# Patient Record
Sex: FEMALE | Race: WHITE | HISPANIC OR LATINO | URBAN - METROPOLITAN AREA
[De-identification: names, ages, dates, MRNs, and addresses within clinical notes are randomized per-mention and may not be internally consistent; named-entity substitution may affect disease eponyms.]

---

## 2017-04-17 ENCOUNTER — INPATIENT (INPATIENT)
Facility: HOSPITAL | Age: 32
LOS: 2 days | Discharge: HOME | End: 2017-04-20
Attending: OBSTETRICS & GYNECOLOGY | Admitting: OBSTETRICS & GYNECOLOGY

## 2017-04-17 VITALS — WEIGHT: 165.35 LBS | HEIGHT: 66 IN

## 2017-04-17 LAB
BASOPHILS # BLD AUTO: 0 K/UL — SIGNIFICANT CHANGE UP (ref 0–0.2)
BASOPHILS NFR BLD AUTO: 0.3 % — SIGNIFICANT CHANGE UP (ref 0–2)
BLD GP AB SCN SERPL QL: SIGNIFICANT CHANGE UP
EOSINOPHIL # BLD AUTO: 0 K/UL — SIGNIFICANT CHANGE UP (ref 0–0.5)
EOSINOPHIL NFR BLD AUTO: 0.2 % — SIGNIFICANT CHANGE UP (ref 0–6)
HCT VFR BLD CALC: 39 % — SIGNIFICANT CHANGE UP (ref 34.5–45)
HGB BLD-MCNC: 12.8 G/DL — SIGNIFICANT CHANGE UP (ref 11.5–15.5)
LYMPHOCYTES # BLD AUTO: 1.7 K/UL — SIGNIFICANT CHANGE UP (ref 1–3.3)
LYMPHOCYTES # BLD AUTO: 12.7 % — LOW (ref 13–44)
MCHC RBC-ENTMCNC: 28.7 PG — SIGNIFICANT CHANGE UP (ref 27–34)
MCHC RBC-ENTMCNC: 32.9 GM/DL — SIGNIFICANT CHANGE UP (ref 32–36)
MCV RBC AUTO: 87.3 FL — SIGNIFICANT CHANGE UP (ref 80–100)
MONOCYTES # BLD AUTO: 0.6 K/UL — SIGNIFICANT CHANGE UP (ref 0–0.9)
MONOCYTES NFR BLD AUTO: 4.5 % — SIGNIFICANT CHANGE UP (ref 2–14)
NEUTROPHILS # BLD AUTO: 11 K/UL — HIGH (ref 1.8–7.4)
NEUTROPHILS NFR BLD AUTO: 82.3 % — HIGH (ref 43–77)
PLATELET # BLD AUTO: 171 K/UL — SIGNIFICANT CHANGE UP (ref 150–400)
RBC # BLD: 4.47 M/UL — SIGNIFICANT CHANGE UP (ref 3.8–5.2)
RBC # FLD: 12 % — SIGNIFICANT CHANGE UP (ref 10.3–14.5)
TYPE + AB SCN PNL BLD: SIGNIFICANT CHANGE UP
WBC # BLD: 13.4 K/UL — HIGH (ref 3.8–10.5)
WBC # FLD AUTO: 13.4 K/UL — HIGH (ref 3.8–10.5)

## 2017-04-17 RX ORDER — CITRIC ACID/SODIUM CITRATE 300-500 MG
30 SOLUTION, ORAL ORAL ONCE
Qty: 0 | Refills: 0 | Status: DISCONTINUED | OUTPATIENT
Start: 2017-04-17 | End: 2017-04-18

## 2017-04-17 RX ORDER — SODIUM CHLORIDE 9 MG/ML
1000 INJECTION, SOLUTION INTRAVENOUS
Qty: 0 | Refills: 0 | Status: DISCONTINUED | OUTPATIENT
Start: 2017-04-17 | End: 2017-04-18

## 2017-04-17 RX ORDER — SODIUM CHLORIDE 9 MG/ML
1000 INJECTION, SOLUTION INTRAVENOUS ONCE
Qty: 0 | Refills: 0 | Status: DISCONTINUED | OUTPATIENT
Start: 2017-04-17 | End: 2017-04-18

## 2017-04-17 RX ORDER — OXYTOCIN 10 UNIT/ML
333.33 VIAL (ML) INJECTION
Qty: 20 | Refills: 0 | Status: COMPLETED | OUTPATIENT
Start: 2017-04-17

## 2017-04-18 LAB
ABO RH CONFIRMATION: SIGNIFICANT CHANGE UP
T PALLIDUM AB TITR SER: NEGATIVE — SIGNIFICANT CHANGE UP

## 2017-04-18 RX ORDER — DIBUCAINE 1 %
1 OINTMENT (GRAM) RECTAL EVERY 4 HOURS
Qty: 0 | Refills: 0 | Status: DISCONTINUED | OUTPATIENT
Start: 2017-04-18 | End: 2017-04-20

## 2017-04-18 RX ORDER — PRAMOXINE HYDROCHLORIDE 150 MG/15G
1 AEROSOL, FOAM RECTAL EVERY 4 HOURS
Qty: 0 | Refills: 0 | Status: DISCONTINUED | OUTPATIENT
Start: 2017-04-18 | End: 2017-04-20

## 2017-04-18 RX ORDER — GLYCERIN ADULT
1 SUPPOSITORY, RECTAL RECTAL AT BEDTIME
Qty: 0 | Refills: 0 | Status: DISCONTINUED | OUTPATIENT
Start: 2017-04-18 | End: 2017-04-20

## 2017-04-18 RX ORDER — HYDROCORTISONE 1 %
1 OINTMENT (GRAM) TOPICAL EVERY 4 HOURS
Qty: 0 | Refills: 0 | Status: DISCONTINUED | OUTPATIENT
Start: 2017-04-18 | End: 2017-04-20

## 2017-04-18 RX ORDER — OXYTOCIN 10 UNIT/ML
2 VIAL (ML) INJECTION
Qty: 30 | Refills: 0 | Status: DISCONTINUED | OUTPATIENT
Start: 2017-04-18 | End: 2017-04-20

## 2017-04-18 RX ORDER — LANOLIN
1 OINTMENT (GRAM) TOPICAL EVERY 6 HOURS
Qty: 0 | Refills: 0 | Status: DISCONTINUED | OUTPATIENT
Start: 2017-04-18 | End: 2017-04-20

## 2017-04-18 RX ORDER — SIMETHICONE 80 MG/1
80 TABLET, CHEWABLE ORAL EVERY 6 HOURS
Qty: 0 | Refills: 0 | Status: DISCONTINUED | OUTPATIENT
Start: 2017-04-18 | End: 2017-04-20

## 2017-04-18 RX ORDER — IBUPROFEN 200 MG
600 TABLET ORAL EVERY 6 HOURS
Qty: 0 | Refills: 0 | Status: DISCONTINUED | OUTPATIENT
Start: 2017-04-18 | End: 2017-04-20

## 2017-04-18 RX ORDER — DOCUSATE SODIUM 100 MG
100 CAPSULE ORAL
Qty: 0 | Refills: 0 | Status: DISCONTINUED | OUTPATIENT
Start: 2017-04-18 | End: 2017-04-20

## 2017-04-18 RX ORDER — SODIUM CHLORIDE 9 MG/ML
3 INJECTION INTRAMUSCULAR; INTRAVENOUS; SUBCUTANEOUS EVERY 8 HOURS
Qty: 0 | Refills: 0 | Status: DISCONTINUED | OUTPATIENT
Start: 2017-04-18 | End: 2017-04-20

## 2017-04-18 RX ORDER — OXYTOCIN 10 UNIT/ML
41.67 VIAL (ML) INJECTION
Qty: 20 | Refills: 0 | Status: DISCONTINUED | OUTPATIENT
Start: 2017-04-18 | End: 2017-04-20

## 2017-04-18 RX ORDER — AER TRAVELER 0.5 G/1
1 SOLUTION RECTAL; TOPICAL EVERY 4 HOURS
Qty: 0 | Refills: 0 | Status: DISCONTINUED | OUTPATIENT
Start: 2017-04-18 | End: 2017-04-20

## 2017-04-18 RX ORDER — MAGNESIUM HYDROXIDE 400 MG/1
30 TABLET, CHEWABLE ORAL
Qty: 0 | Refills: 0 | Status: DISCONTINUED | OUTPATIENT
Start: 2017-04-18 | End: 2017-04-20

## 2017-04-18 RX ORDER — DIPHENHYDRAMINE HCL 50 MG
25 CAPSULE ORAL EVERY 6 HOURS
Qty: 0 | Refills: 0 | Status: DISCONTINUED | OUTPATIENT
Start: 2017-04-18 | End: 2017-04-20

## 2017-04-18 RX ORDER — OXYTOCIN 10 UNIT/ML
333.33 VIAL (ML) INJECTION
Qty: 20 | Refills: 0 | Status: COMPLETED | OUTPATIENT
Start: 2017-04-18 | End: 2017-04-18

## 2017-04-18 RX ADMIN — Medication 2 MILLIUNIT(S)/MIN: at 07:43

## 2017-04-18 RX ADMIN — Medication 1000 MILLIUNIT(S)/MIN: at 09:40

## 2017-04-18 RX ADMIN — Medication 600 MILLIGRAM(S): at 14:54

## 2017-04-19 LAB
BASOPHILS # BLD AUTO: 0 K/UL — SIGNIFICANT CHANGE UP (ref 0–0.2)
BASOPHILS NFR BLD AUTO: 0.4 % — SIGNIFICANT CHANGE UP (ref 0–2)
EOSINOPHIL # BLD AUTO: 0.2 K/UL — SIGNIFICANT CHANGE UP (ref 0–0.5)
EOSINOPHIL NFR BLD AUTO: 1.8 % — SIGNIFICANT CHANGE UP (ref 0–6)
HCT VFR BLD CALC: 33.4 % — LOW (ref 34.5–45)
HGB BLD-MCNC: 10.6 G/DL — LOW (ref 11.5–15.5)
LYMPHOCYTES # BLD AUTO: 19.7 % — SIGNIFICANT CHANGE UP (ref 13–44)
LYMPHOCYTES # BLD AUTO: 2.5 K/UL — SIGNIFICANT CHANGE UP (ref 1–3.3)
MCHC RBC-ENTMCNC: 28.1 PG — SIGNIFICANT CHANGE UP (ref 27–34)
MCHC RBC-ENTMCNC: 31.9 GM/DL — LOW (ref 32–36)
MCV RBC AUTO: 88.1 FL — SIGNIFICANT CHANGE UP (ref 80–100)
MONOCYTES # BLD AUTO: 0.6 K/UL — SIGNIFICANT CHANGE UP (ref 0–0.9)
MONOCYTES NFR BLD AUTO: 5 % — SIGNIFICANT CHANGE UP (ref 2–14)
NEUTROPHILS # BLD AUTO: 9.4 K/UL — HIGH (ref 1.8–7.4)
NEUTROPHILS NFR BLD AUTO: 73.2 % — SIGNIFICANT CHANGE UP (ref 43–77)
PLATELET # BLD AUTO: 153 K/UL — SIGNIFICANT CHANGE UP (ref 150–400)
RBC # BLD: 3.78 M/UL — LOW (ref 3.8–5.2)
RBC # FLD: 12.3 % — SIGNIFICANT CHANGE UP (ref 10.3–14.5)
WBC # BLD: 12.8 K/UL — HIGH (ref 3.8–10.5)
WBC # FLD AUTO: 12.8 K/UL — HIGH (ref 3.8–10.5)

## 2017-04-19 RX ADMIN — Medication 600 MILLIGRAM(S): at 06:08

## 2017-04-19 RX ADMIN — Medication 100 MILLIGRAM(S): at 22:01

## 2017-04-19 RX ADMIN — Medication 100 MILLIGRAM(S): at 12:10

## 2017-04-19 RX ADMIN — Medication 600 MILLIGRAM(S): at 12:18

## 2017-04-19 NOTE — PROGRESS NOTE ADULT - SUBJECTIVE AND OBJECTIVE BOX
PPD #1   no laceration    Pt denies any heavy vaginal bleeding.  Pt pumping and will be stimulating lactation, while baby is under close observation in the nursery for pulmonary issues.    T(C): 36.7, Max: 36.7 (-19 @ 08:22)  HR: 72 (51 - 72)  BP: 120/64 (112/73 - 141/83)  RR: 16 (16 - 16)  SpO2: 98% (98% - 100%)    Abd:  soft, NT  Fundus:  firm  - Lochia- Moderate  Extr:  no Saundra's sign, +1 pedal edema B/L                        10.6   12.8  )-----------( 153      ( 2017 06:03 )             33.4         A/P:  PPD# 1      Stable from vaginal delivery  routine postpartum care and education  Breast feeding support and education.

## 2017-04-20 VITALS
TEMPERATURE: 98 F | OXYGEN SATURATION: 98 % | HEART RATE: 62 BPM | DIASTOLIC BLOOD PRESSURE: 77 MMHG | SYSTOLIC BLOOD PRESSURE: 129 MMHG | RESPIRATION RATE: 16 BRPM

## 2017-04-20 RX ORDER — DOCUSATE SODIUM 100 MG
1 CAPSULE ORAL
Qty: 0 | Refills: 0 | DISCHARGE
Start: 2017-04-20

## 2017-04-20 RX ADMIN — Medication 600 MILLIGRAM(S): at 05:37

## 2017-04-20 RX ADMIN — Medication 600 MILLIGRAM(S): at 13:10

## 2017-04-20 NOTE — DISCHARGE NOTE OB - CARE PROVIDER_API CALL
Cameron Stone (DO), Obstetrics and Gynecology  [address] [place] [address]  Phone: [phone]  Fax: [phone]

## 2017-04-20 NOTE — DISCHARGE NOTE OB - HOSPITAL COURSE
30 yo  at 40 2/7 weeks gestation presented c/o painful contractions with cx dilated to 2-3cm.  Pt progressed to full dilatation with epidural anaesthesia and some pitocin augmentation at 9.5cm.  Pt delivered with no complications vaginally with no perineal laceration.  Pt recovered uneventfully and is being discharged home on postpartum day 2.  Infant will be observed longer in the nursery.

## 2017-04-20 NOTE — DISCHARGE NOTE OB - PATIENT PORTAL LINK FT
“You can access the FollowHealth Patient Portal, offered by [facility], by registering with the following website: [email or web address]”

## 2017-04-20 NOTE — DISCHARGE NOTE OB - MEDICATION SUMMARY - MEDICATIONS TO TAKE
I will START or STAY ON the medications listed below when I get home from the hospital:    docusate sodium 100 mg oral capsule  -- 1 cap(s) by mouth 2 times a day, As needed, Stool Softening  -- Indication: For constipation

## 2017-04-20 NOTE — PROGRESS NOTE ADULT - SUBJECTIVE AND OBJECTIVE BOX
PPD #2      T(C): 36.8, Max: 36.8 (04-19 @ 15:00)  HR: 54 (54 - 61)  BP: 135/70 (121/80 - 136/82)  RR: 16 (16 - 16)  SpO2: 99% (99% - 99%)    Abd:  soft, NT  Fundus:  firm  - Lochia- Moderate  Extr:  no Saundra's sign, no pedal edema                        10.6   12.8  )-----------( 153      ( 2017 06:03 )             33.4         A/P:  PPD#2       routine postpartum care and education  Breast feeding support and education.

## 2017-04-20 NOTE — DISCHARGE NOTE OB - CARE PLAN
Principal Discharge DX:	Vaginal delivery  Goal:	recovery  Instructions for follow-up, activity and diet:	regular diet, pelvic rest for 6 weeks

## 2017-04-24 DIAGNOSIS — Z3A.40 40 WEEKS GESTATION OF PREGNANCY: ICD-10-CM

## 2017-04-24 DIAGNOSIS — O48.0 POST-TERM PREGNANCY: ICD-10-CM

## 2018-07-10 ENCOUNTER — RESULT REVIEW (OUTPATIENT)
Age: 33
End: 2018-07-10

## 2018-08-01 ENCOUNTER — OUTPATIENT (OUTPATIENT)
Dept: OUTPATIENT SERVICES | Facility: HOSPITAL | Age: 33
LOS: 1 days | End: 2018-08-01

## 2018-08-01 ENCOUNTER — APPOINTMENT (OUTPATIENT)
Dept: ULTRASOUND IMAGING | Facility: CLINIC | Age: 33
End: 2018-08-01
Payer: SELF-PAY

## 2018-08-01 DIAGNOSIS — Z00.8 ENCOUNTER FOR OTHER GENERAL EXAMINATION: ICD-10-CM

## 2018-08-01 PROCEDURE — 76856 US EXAM PELVIC COMPLETE: CPT

## 2018-08-01 PROCEDURE — 76830 TRANSVAGINAL US NON-OB: CPT | Mod: 26

## 2018-08-01 PROCEDURE — 76830 TRANSVAGINAL US NON-OB: CPT

## 2018-08-01 PROCEDURE — 76856 US EXAM PELVIC COMPLETE: CPT | Mod: 26

## 2018-11-28 ENCOUNTER — RESULT REVIEW (OUTPATIENT)
Age: 33
End: 2018-11-28

## 2019-02-11 ENCOUNTER — ASOB RESULT (OUTPATIENT)
Age: 34
End: 2019-02-11

## 2019-02-11 ENCOUNTER — APPOINTMENT (OUTPATIENT)
Age: 34
End: 2019-02-11
Payer: MEDICAID

## 2019-02-11 PROCEDURE — 76813 OB US NUCHAL MEAS 1 GEST: CPT

## 2019-04-08 ENCOUNTER — ASOB RESULT (OUTPATIENT)
Age: 34
End: 2019-04-08

## 2019-04-08 ENCOUNTER — APPOINTMENT (OUTPATIENT)
Age: 34
End: 2019-04-08
Payer: MEDICAID

## 2019-04-08 PROCEDURE — 76805 OB US >/= 14 WKS SNGL FETUS: CPT

## 2019-04-22 ENCOUNTER — ASOB RESULT (OUTPATIENT)
Age: 34
End: 2019-04-22

## 2019-04-22 ENCOUNTER — APPOINTMENT (OUTPATIENT)
Age: 34
End: 2019-04-22
Payer: MEDICAID

## 2019-04-22 PROCEDURE — 76816 OB US FOLLOW-UP PER FETUS: CPT

## 2019-07-22 ENCOUNTER — APPOINTMENT (OUTPATIENT)
Dept: ANTEPARTUM | Facility: CLINIC | Age: 34
End: 2019-07-22

## 2019-07-24 ENCOUNTER — APPOINTMENT (OUTPATIENT)
Dept: ANTEPARTUM | Facility: CLINIC | Age: 34
End: 2019-07-24
Payer: MEDICAID

## 2019-07-24 ENCOUNTER — ASOB RESULT (OUTPATIENT)
Age: 34
End: 2019-07-24

## 2019-07-24 PROCEDURE — 76819 FETAL BIOPHYS PROFIL W/O NST: CPT

## 2019-07-24 PROCEDURE — 76816 OB US FOLLOW-UP PER FETUS: CPT

## 2019-08-18 ENCOUNTER — INPATIENT (INPATIENT)
Facility: HOSPITAL | Age: 34
LOS: 1 days | Discharge: HOME HEALTH | DRG: 541 | End: 2019-08-20
Attending: OBSTETRICS & GYNECOLOGY | Admitting: OBSTETRICS & GYNECOLOGY
Payer: MEDICAID

## 2019-08-18 ENCOUNTER — OUTPATIENT (OUTPATIENT)
Dept: INPATIENT UNIT | Facility: HOSPITAL | Age: 34
LOS: 1 days | Discharge: HOME | End: 2019-08-18

## 2019-08-18 ENCOUNTER — TRANSCRIPTION ENCOUNTER (OUTPATIENT)
Age: 34
End: 2019-08-18

## 2019-08-18 VITALS
SYSTOLIC BLOOD PRESSURE: 128 MMHG | DIASTOLIC BLOOD PRESSURE: 87 MMHG | HEART RATE: 74 BPM | RESPIRATION RATE: 18 BRPM | TEMPERATURE: 99 F

## 2019-08-18 DIAGNOSIS — Z34.90 ENCOUNTER FOR SUPERVISION OF NORMAL PREGNANCY, UNSPECIFIED, UNSPECIFIED TRIMESTER: ICD-10-CM

## 2019-08-18 DIAGNOSIS — O26.899 OTHER SPECIFIED PREGNANCY RELATED CONDITIONS, UNSPECIFIED TRIMESTER: ICD-10-CM

## 2019-08-18 LAB
BASOPHILS # BLD AUTO: 0.05 K/UL — SIGNIFICANT CHANGE UP (ref 0–0.2)
BASOPHILS NFR BLD AUTO: 0.5 % — SIGNIFICANT CHANGE UP (ref 0–2)
EOSINOPHIL # BLD AUTO: 0.05 K/UL — SIGNIFICANT CHANGE UP (ref 0–0.5)
EOSINOPHIL NFR BLD AUTO: 0.5 % — SIGNIFICANT CHANGE UP (ref 0–6)
HCT VFR BLD CALC: 36.9 % — SIGNIFICANT CHANGE UP (ref 34.5–45)
HGB BLD-MCNC: 12.6 G/DL — SIGNIFICANT CHANGE UP (ref 11.5–15.5)
IMM GRANULOCYTES NFR BLD AUTO: 0.5 % — SIGNIFICANT CHANGE UP (ref 0–1.5)
LYMPHOCYTES # BLD AUTO: 2.02 K/UL — SIGNIFICANT CHANGE UP (ref 1–3.3)
LYMPHOCYTES # BLD AUTO: 20.2 % — SIGNIFICANT CHANGE UP (ref 13–44)
MCHC RBC-ENTMCNC: 29 PG — SIGNIFICANT CHANGE UP (ref 27–34)
MCHC RBC-ENTMCNC: 34.1 GM/DL — SIGNIFICANT CHANGE UP (ref 32–36)
MCV RBC AUTO: 85 FL — SIGNIFICANT CHANGE UP (ref 80–100)
MONOCYTES # BLD AUTO: 0.6 K/UL — SIGNIFICANT CHANGE UP (ref 0–0.9)
MONOCYTES NFR BLD AUTO: 6 % — SIGNIFICANT CHANGE UP (ref 2–14)
NEUTROPHILS # BLD AUTO: 7.24 K/UL — SIGNIFICANT CHANGE UP (ref 1.8–7.4)
NEUTROPHILS NFR BLD AUTO: 72.3 % — SIGNIFICANT CHANGE UP (ref 43–77)
PLATELET # BLD AUTO: 161 K/UL — SIGNIFICANT CHANGE UP (ref 150–400)
RBC # BLD: 4.34 M/UL — SIGNIFICANT CHANGE UP (ref 3.8–5.2)
RBC # FLD: 12.5 % — SIGNIFICANT CHANGE UP (ref 10.3–14.5)
WBC # BLD: 10.01 K/UL — SIGNIFICANT CHANGE UP (ref 3.8–10.5)
WBC # FLD AUTO: 10.01 K/UL — SIGNIFICANT CHANGE UP (ref 3.8–10.5)

## 2019-08-18 PROCEDURE — 59050 FETAL MONITOR W/REPORT: CPT

## 2019-08-18 PROCEDURE — G0463: CPT

## 2019-08-18 PROCEDURE — 86901 BLOOD TYPING SEROLOGIC RH(D): CPT

## 2019-08-18 PROCEDURE — 85025 COMPLETE CBC W/AUTO DIFF WBC: CPT

## 2019-08-18 PROCEDURE — 85018 HEMOGLOBIN: CPT

## 2019-08-18 PROCEDURE — 86780 TREPONEMA PALLIDUM: CPT

## 2019-08-18 PROCEDURE — 86850 RBC ANTIBODY SCREEN: CPT

## 2019-08-18 PROCEDURE — 36415 COLL VENOUS BLD VENIPUNCTURE: CPT

## 2019-08-18 PROCEDURE — 94760 N-INVAS EAR/PLS OXIMETRY 1: CPT

## 2019-08-18 PROCEDURE — 88302 TISSUE EXAM BY PATHOLOGIST: CPT

## 2019-08-18 PROCEDURE — 85014 HEMATOCRIT: CPT

## 2019-08-18 PROCEDURE — 86900 BLOOD TYPING SEROLOGIC ABO: CPT

## 2019-08-18 RX ORDER — DOCUSATE SODIUM 100 MG
100 CAPSULE ORAL
Refills: 0 | Status: DISCONTINUED | OUTPATIENT
Start: 2019-08-18 | End: 2019-08-20

## 2019-08-18 RX ORDER — KETOROLAC TROMETHAMINE 30 MG/ML
30 SYRINGE (ML) INJECTION ONCE
Refills: 0 | Status: DISCONTINUED | OUTPATIENT
Start: 2019-08-18 | End: 2019-08-18

## 2019-08-18 RX ORDER — IBUPROFEN 200 MG
600 TABLET ORAL EVERY 6 HOURS
Refills: 0 | Status: COMPLETED | OUTPATIENT
Start: 2019-08-18 | End: 2020-07-16

## 2019-08-18 RX ORDER — HYDROCORTISONE 1 %
1 OINTMENT (GRAM) TOPICAL EVERY 6 HOURS
Refills: 0 | Status: DISCONTINUED | OUTPATIENT
Start: 2019-08-18 | End: 2019-08-20

## 2019-08-18 RX ORDER — SIMETHICONE 80 MG/1
80 TABLET, CHEWABLE ORAL EVERY 4 HOURS
Refills: 0 | Status: DISCONTINUED | OUTPATIENT
Start: 2019-08-18 | End: 2019-08-20

## 2019-08-18 RX ORDER — LANOLIN
1 OINTMENT (GRAM) TOPICAL EVERY 6 HOURS
Refills: 0 | Status: DISCONTINUED | OUTPATIENT
Start: 2019-08-18 | End: 2019-08-20

## 2019-08-18 RX ORDER — OXYTOCIN 10 UNIT/ML
333.33 VIAL (ML) INJECTION
Qty: 20 | Refills: 0 | Status: DISCONTINUED | OUTPATIENT
Start: 2019-08-18 | End: 2019-08-18

## 2019-08-18 RX ORDER — GLYCERIN ADULT
1 SUPPOSITORY, RECTAL RECTAL AT BEDTIME
Refills: 0 | Status: DISCONTINUED | OUTPATIENT
Start: 2019-08-18 | End: 2019-08-20

## 2019-08-18 RX ORDER — AER TRAVELER 0.5 G/1
1 SOLUTION RECTAL; TOPICAL EVERY 4 HOURS
Refills: 0 | Status: DISCONTINUED | OUTPATIENT
Start: 2019-08-18 | End: 2019-08-20

## 2019-08-18 RX ORDER — SODIUM CHLORIDE 9 MG/ML
3 INJECTION INTRAMUSCULAR; INTRAVENOUS; SUBCUTANEOUS EVERY 8 HOURS
Refills: 0 | Status: DISCONTINUED | OUTPATIENT
Start: 2019-08-18 | End: 2019-08-20

## 2019-08-18 RX ORDER — MAGNESIUM HYDROXIDE 400 MG/1
30 TABLET, CHEWABLE ORAL
Refills: 0 | Status: DISCONTINUED | OUTPATIENT
Start: 2019-08-18 | End: 2019-08-20

## 2019-08-18 RX ORDER — OXYTOCIN 10 UNIT/ML
333.33 VIAL (ML) INJECTION
Qty: 20 | Refills: 0 | Status: DISCONTINUED | OUTPATIENT
Start: 2019-08-18 | End: 2019-08-20

## 2019-08-18 RX ORDER — DIPHENHYDRAMINE HCL 50 MG
25 CAPSULE ORAL EVERY 6 HOURS
Refills: 0 | Status: DISCONTINUED | OUTPATIENT
Start: 2019-08-18 | End: 2019-08-20

## 2019-08-18 RX ORDER — CITRIC ACID/SODIUM CITRATE 300-500 MG
30 SOLUTION, ORAL ORAL ONCE
Refills: 0 | Status: DISCONTINUED | OUTPATIENT
Start: 2019-08-18 | End: 2019-08-18

## 2019-08-18 RX ORDER — ACETAMINOPHEN 500 MG
975 TABLET ORAL
Refills: 0 | Status: DISCONTINUED | OUTPATIENT
Start: 2019-08-18 | End: 2019-08-20

## 2019-08-18 RX ORDER — SODIUM CHLORIDE 9 MG/ML
1000 INJECTION, SOLUTION INTRAVENOUS
Refills: 0 | Status: DISCONTINUED | OUTPATIENT
Start: 2019-08-18 | End: 2019-08-18

## 2019-08-18 RX ORDER — OXYCODONE HYDROCHLORIDE 5 MG/1
5 TABLET ORAL ONCE
Refills: 0 | Status: DISCONTINUED | OUTPATIENT
Start: 2019-08-18 | End: 2019-08-20

## 2019-08-18 RX ORDER — BENZOCAINE 10 %
1 GEL (GRAM) MUCOUS MEMBRANE EVERY 6 HOURS
Refills: 0 | Status: DISCONTINUED | OUTPATIENT
Start: 2019-08-18 | End: 2019-08-20

## 2019-08-18 RX ORDER — PRAMOXINE HYDROCHLORIDE 150 MG/15G
1 AEROSOL, FOAM RECTAL EVERY 4 HOURS
Refills: 0 | Status: DISCONTINUED | OUTPATIENT
Start: 2019-08-18 | End: 2019-08-20

## 2019-08-18 RX ORDER — IBUPROFEN 200 MG
600 TABLET ORAL EVERY 6 HOURS
Refills: 0 | Status: DISCONTINUED | OUTPATIENT
Start: 2019-08-18 | End: 2019-08-20

## 2019-08-18 RX ORDER — OXYCODONE HYDROCHLORIDE 5 MG/1
5 TABLET ORAL
Refills: 0 | Status: DISCONTINUED | OUTPATIENT
Start: 2019-08-18 | End: 2019-08-20

## 2019-08-18 RX ORDER — DIBUCAINE 1 %
1 OINTMENT (GRAM) RECTAL EVERY 6 HOURS
Refills: 0 | Status: DISCONTINUED | OUTPATIENT
Start: 2019-08-18 | End: 2019-08-20

## 2019-08-18 RX ORDER — TETANUS TOXOID, REDUCED DIPHTHERIA TOXOID AND ACELLULAR PERTUSSIS VACCINE, ADSORBED 5; 2.5; 8; 8; 2.5 [IU]/.5ML; [IU]/.5ML; UG/.5ML; UG/.5ML; UG/.5ML
0.5 SUSPENSION INTRAMUSCULAR ONCE
Refills: 0 | Status: DISCONTINUED | OUTPATIENT
Start: 2019-08-18 | End: 2019-08-20

## 2019-08-18 RX ADMIN — Medication 1000 MILLIUNIT(S)/MIN: at 20:25

## 2019-08-18 RX ADMIN — Medication 30 MILLIGRAM(S): at 20:25

## 2019-08-18 NOTE — DISCHARGE NOTE OB - MEDICATION SUMMARY - MEDICATIONS TO TAKE
I will START or STAY ON the medications listed below when I get home from the hospital:    ibuprofen 600 mg oral tablet  -- 1 tab(s) by mouth every 6 hours, As needed, Moderate Pain -for moderate pain MDD:4  -- Indication: For for moderate pain, postpartum cramps    Classic Prenatal oral tablet  -- 1 tab(s) by mouth once a day   -- May discolor urine or feces.  Take with food or milk.    -- Indication: For continue daily    docusate sodium 100 mg oral capsule  -- 1 cap(s) by mouth 4 times a day, As Needed, Stool Softening -for constipation  -- Indication: For for constipation    calcium-vitamin D 500 mg-200 intl units oral tablet  -- 1 tab(s) by mouth 2 times a day  while breast feeding   -- Indication: For take while breast feeding

## 2019-08-18 NOTE — DISCHARGE NOTE OB - PATIENT PORTAL LINK FT
You can access the [facility] Patient Portal, offered by [facility], by registering with the following website: [email or web address]

## 2019-08-18 NOTE — DISCHARGE NOTE OB - CARE PLAN
Principal Discharge DX:	Vaginal delivery  Goal:	recovery  Assessment and plan of treatment:	nothing per vagina for six weeks.  Please call for an appointment six weeks after delivery in the office.

## 2019-08-18 NOTE — DISCHARGE NOTE OB - CARE PROVIDER_API CALL
Cameron Stone (DO)  Obstetrics and Gynecology  [address] [facility], [address] [place] [address]  Phone: [phone]  Fax: [phone]  Follow Up Time:

## 2019-08-18 NOTE — DISCHARGE NOTE OB - PLAN OF CARE
nothing per vagina for six weeks.  Please call for an appointment six weeks after delivery in the office. recovery

## 2019-08-18 NOTE — DISCHARGE NOTE OB - HOSPITAL COURSE
34 yo  at 39 5/7 weeks gestation, GBS negative presented to L+D c/o painful contractions.  She was found to be fully dilated with membranes intact.  Her CBC upon admission was:                        12.6   10.01 )-----------( 161      ( 18 Aug 2019 19:28 )             36.9   IV access was secured and she progressed to delivery upon amniorrhexis.  She delivered a viable female infant at 19:22 from OA position, over an intact perineum, with no nuchal cord.  Apgars 9/9.  Pt's recovery was uneventful.  Pt also expressed desire for permanent sterilization. 32 yo  at 39 5/7 weeks gestation, GBS negative presented to L+D c/o painful contractions.  She was found to be fully dilated with membranes intact.  Her CBC upon admission was:                        12.6   10.01 )-----------( 161      ( 18 Aug 2019 19:28 )             36.9   IV access was secured and she progressed to delivery upon amniorrhexis.  She delivered a viable female infant at 19:22 from OA position, over an intact perineum, with no nuchal cord.  Apgars 9/9.  Pt's recovery was uneventful.  Pt also expressed desire for permanent sterilization.  A postpartum  bilateral salpingiectomy was performed on postpartum day #1 under spinal anaesthesia with no complications.  Pt's postpartum CBC:                        11.1   x     )-----------( x        ( 19 Aug 2019 07:00 )             32.4   Pt is being discharged home in stable condition with her  infant on postpartum day #2.

## 2019-08-19 ENCOUNTER — RESULT REVIEW (OUTPATIENT)
Age: 34
End: 2019-08-19

## 2019-08-19 DIAGNOSIS — Z30.2 ENCOUNTER FOR STERILIZATION: ICD-10-CM

## 2019-08-19 DIAGNOSIS — O26.899 OTHER SPECIFIED PREGNANCY RELATED CONDITIONS, UNSPECIFIED TRIMESTER: ICD-10-CM

## 2019-08-19 LAB
HCT VFR BLD CALC: 32.4 % — LOW (ref 34.5–45)
HGB BLD-MCNC: 11.1 G/DL — LOW (ref 11.5–15.5)
T PALLIDUM AB TITR SER: NEGATIVE — SIGNIFICANT CHANGE UP

## 2019-08-19 PROCEDURE — 88302 TISSUE EXAM BY PATHOLOGIST: CPT | Mod: 26

## 2019-08-19 RX ORDER — KETOROLAC TROMETHAMINE 30 MG/ML
30 SYRINGE (ML) INJECTION ONCE
Refills: 0 | Status: DISCONTINUED | OUTPATIENT
Start: 2019-08-19 | End: 2019-08-20

## 2019-08-19 RX ORDER — ACETAMINOPHEN 500 MG
1000 TABLET ORAL ONCE
Refills: 0 | Status: COMPLETED | OUTPATIENT
Start: 2019-08-19 | End: 2019-08-19

## 2019-08-19 RX ADMIN — Medication 400 MILLIGRAM(S): at 14:36

## 2019-08-19 RX ADMIN — Medication 975 MILLIGRAM(S): at 22:21

## 2019-08-19 RX ADMIN — SODIUM CHLORIDE 3 MILLILITER(S): 9 INJECTION INTRAMUSCULAR; INTRAVENOUS; SUBCUTANEOUS at 06:39

## 2019-08-19 RX ADMIN — Medication 600 MILLIGRAM(S): at 18:01

## 2019-08-19 RX ADMIN — Medication 600 MILLIGRAM(S): at 18:00

## 2019-08-19 RX ADMIN — SIMETHICONE 80 MILLIGRAM(S): 80 TABLET, CHEWABLE ORAL at 18:00

## 2019-08-19 RX ADMIN — Medication 1000 MILLIGRAM(S): at 15:46

## 2019-08-19 RX ADMIN — Medication 975 MILLIGRAM(S): at 22:30

## 2019-08-19 NOTE — PROGRESS NOTE ADULT - ASSESSMENT
33y year old  PPP#1 s/p  at 39wks gestation, in stable condition.   - continue routine post partum care  - continue to encourage ambulation, breast feeding, and adequate PO intake   - continue pain medication PRN   - OR today

## 2019-08-19 NOTE — BRIEF OPERATIVE NOTE - OPERATION/FINDINGS
1.  Infraumbilical skin incision of 2cm  2.  Uterine fundus noted one fingerbreadth below the umbilicus  3.  Grossly normal adnexal anatomy.

## 2019-08-19 NOTE — PROGRESS NOTE ADULT - SUBJECTIVE AND OBJECTIVE BOX
33y year old  PPP#1 s/p  at 39wks gestation, uncomplicated.      No acute overnight events. Pain well controlled.   Patient is ambulating, +voiding, -flatus, -BM  Reports minimal lochia.   NPO since midnight, set for post partum tubal ligation today.     VS:   Vital Signs Last 24 Hrs  T(C): 36.5 (18 Aug 2019 23:46), Max: 37.2 (18 Aug 2019 19:30)  T(F): 97.7 (18 Aug 2019 23:46), Max: 99 (18 Aug 2019 19:30)  HR: 58 (18 Aug 2019 23:46) (54 - 74)  BP: 127/81 (18 Aug 2019 23:46) (125/71 - 133/82)  RR: 17 (18 Aug 2019 23:46) (17 - 18)  SpO2: 99% (18 Aug 2019 23:46) (99% - 99%)    Physical Exam:  General: NAD  Abdomen: soft, ND, firm fundus palpated at the umbilicus.   Ext: nontender lower extremity pain bilaterally.    Labs: AM labs pending                        12.6   10. )-----------( 161      ( 18 Aug 2019 19:28 )             36.9 33y year old  PPP#1 s/p  at 39wks gestation, uncomplicated.      No acute overnight events. Pain well controlled.   Patient is ambulating, +voiding, -flatus, -BM  Reports minimal lochia.   NPO since midnight, set for post partum tubal ligation today.    VS:   Vital Signs Last 24 Hrs  T(C): 36.5 (18 Aug 2019 23:46), Max: 37.2 (18 Aug 2019 19:30)  T(F): 97.7 (18 Aug 2019 23:46), Max: 99 (18 Aug 2019 19:30)  HR: 58 (18 Aug 2019 23:46) (54 - 74)  BP: 127/81 (18 Aug 2019 23:46) (125/71 - 133/82)  RR: 17 (18 Aug 2019 23:46) (17 - 18)  SpO2: 99% (18 Aug 2019 23:46) (99% - 99%)    Physical Exam:  General: NAD  Abdomen: soft, ND, firm fundus palpated at the umbilicus.   Ext: nontender lower extremity pain bilaterally.    Labs: AM labs pending                        12.6   10. )-----------( 161      ( 18 Aug 2019 19:28 )             36.9

## 2019-08-20 VITALS
OXYGEN SATURATION: 99 % | SYSTOLIC BLOOD PRESSURE: 118 MMHG | RESPIRATION RATE: 16 BRPM | DIASTOLIC BLOOD PRESSURE: 71 MMHG | HEART RATE: 57 BPM | TEMPERATURE: 98 F

## 2019-08-20 RX ORDER — DOCUSATE SODIUM 100 MG
1 CAPSULE ORAL
Qty: 60 | Refills: 0
Start: 2019-08-20

## 2019-08-20 RX ORDER — OXYCODONE HYDROCHLORIDE 5 MG/1
1 TABLET ORAL
Qty: 20 | Refills: 0
Start: 2019-08-20

## 2019-08-20 RX ORDER — IBUPROFEN 200 MG
1 TABLET ORAL
Qty: 30 | Refills: 1
Start: 2019-08-20 | End: 2019-10-18

## 2019-08-20 RX ADMIN — Medication 975 MILLIGRAM(S): at 15:04

## 2019-08-20 RX ADMIN — Medication 600 MILLIGRAM(S): at 18:36

## 2019-08-20 RX ADMIN — Medication 975 MILLIGRAM(S): at 08:59

## 2019-08-20 RX ADMIN — Medication 975 MILLIGRAM(S): at 09:59

## 2019-08-20 RX ADMIN — Medication 600 MILLIGRAM(S): at 00:27

## 2019-08-20 RX ADMIN — Medication 100 MILLIGRAM(S): at 08:58

## 2019-08-20 RX ADMIN — Medication 1 TABLET(S): at 15:03

## 2019-08-20 RX ADMIN — Medication 600 MILLIGRAM(S): at 17:43

## 2019-08-20 RX ADMIN — Medication 600 MILLIGRAM(S): at 01:27

## 2019-08-20 RX ADMIN — Medication 600 MILLIGRAM(S): at 06:34

## 2019-08-20 RX ADMIN — Medication 600 MILLIGRAM(S): at 06:39

## 2019-08-20 RX ADMIN — Medication 975 MILLIGRAM(S): at 16:04

## 2019-08-20 NOTE — PROGRESS NOTE ADULT - SUBJECTIVE AND OBJECTIVE BOX
33y year old  PPP#2 s/p  at 39wks gestation, uncomplicated, now POD#1 s/p post partum bilateral salpingectomy, uncomplicated.     No acute overnight events. Pain well controlled, minimal.   Patient is ambulating when interviewed, +voiding, +flatus, -BM  Reports minimal lochia.   +breast feeding, -breast tenderness  Denies fevers, chills, malaise, fatigue, and myalgia.   Denies dizziness, lightheadedness, SOB, palpitations, or fatigue at rest or while ambulating.     VS:   Vital Signs Last 24 Hrs  T(C): 36.6 (19 Aug 2019 20:19), Max: 37 (19 Aug 2019 15:23)  T(F): 97.8 (19 Aug 2019 20:19), Max: 98.6 (19 Aug 2019 15:23)  HR: 47 (19 Aug 2019 20:19) (44 - 74)  BP: 127/81 (19 Aug 2019 20:19) (107/69 - 141/89)  BP(mean): --  RR: 16 (19 Aug 2019 20:19) (14 - 18)  SpO2: 99% (19 Aug 2019 20:19) (99% - 100%)    Physical Exam:  General: NAD  Abdomen: soft, ND, firm fundus palpated at the umbilicus.   Ext: nontender lower extremity pain bilaterally.    Labs:                        11.1   x     )-----------( x        ( 19 Aug 2019 07:00 )             32.4

## 2019-08-20 NOTE — PROGRESS NOTE ADULT - ASSESSMENT
33y year old  PPP#2 s/p  at 39wks gestation, uncomplicated, now POD#1 s/p post partum bilateral salpingectomy, uncomplicated.   Stable condition.   - continue routine post partum care  - continue to encourage ambulation, breast feeding, and adequate PO intake   - continue pain medication PRN   - ready for discharge today

## 2019-08-20 NOTE — PROGRESS NOTE ADULT - ATTENDING COMMENTS
Note appreciated.  Pt seen and examined.  Agree with plan for discharge home today.  Follow up in one week for incision check.

## 2019-08-30 DIAGNOSIS — Z30.2 ENCOUNTER FOR STERILIZATION: ICD-10-CM

## 2019-08-30 DIAGNOSIS — Z3A.39 39 WEEKS GESTATION OF PREGNANCY: ICD-10-CM

## 2019-11-11 ENCOUNTER — APPOINTMENT (OUTPATIENT)
Dept: ULTRASOUND IMAGING | Facility: CLINIC | Age: 34
End: 2019-11-11

## 2020-01-23 ENCOUNTER — APPOINTMENT (OUTPATIENT)
Dept: ULTRASOUND IMAGING | Facility: CLINIC | Age: 35
End: 2020-01-23

## 2020-09-03 ENCOUNTER — APPOINTMENT (OUTPATIENT)
Dept: ULTRASOUND IMAGING | Facility: CLINIC | Age: 35
End: 2020-09-03
Payer: COMMERCIAL

## 2020-09-03 ENCOUNTER — OUTPATIENT (OUTPATIENT)
Dept: OUTPATIENT SERVICES | Facility: HOSPITAL | Age: 35
LOS: 1 days | End: 2020-09-03
Payer: SELF-PAY

## 2020-09-03 DIAGNOSIS — Z00.8 ENCOUNTER FOR OTHER GENERAL EXAMINATION: ICD-10-CM

## 2020-09-03 PROCEDURE — 76536 US EXAM OF HEAD AND NECK: CPT

## 2020-09-03 PROCEDURE — 76536 US EXAM OF HEAD AND NECK: CPT | Mod: 26

## 2020-10-19 ENCOUNTER — APPOINTMENT (OUTPATIENT)
Dept: OTOLARYNGOLOGY | Facility: CLINIC | Age: 35
End: 2020-10-19

## 2020-11-02 ENCOUNTER — APPOINTMENT (OUTPATIENT)
Dept: OTOLARYNGOLOGY | Facility: CLINIC | Age: 35
End: 2020-11-02

## 2020-12-14 ENCOUNTER — APPOINTMENT (OUTPATIENT)
Dept: OTOLARYNGOLOGY | Facility: CLINIC | Age: 35
End: 2020-12-14
Payer: COMMERCIAL

## 2020-12-14 ENCOUNTER — OUTPATIENT (OUTPATIENT)
Dept: OUTPATIENT SERVICES | Facility: HOSPITAL | Age: 35
LOS: 1 days | Discharge: HOME | End: 2020-12-14

## 2020-12-14 VITALS
SYSTOLIC BLOOD PRESSURE: 102 MMHG | HEART RATE: 70 BPM | WEIGHT: 130 LBS | HEIGHT: 66 IN | DIASTOLIC BLOOD PRESSURE: 70 MMHG | BODY MASS INDEX: 20.89 KG/M2

## 2020-12-14 DIAGNOSIS — Z86.39 PERSONAL HISTORY OF OTHER ENDOCRINE, NUTRITIONAL AND METABOLIC DISEASE: ICD-10-CM

## 2020-12-14 DIAGNOSIS — R13.10 DYSPHAGIA, UNSPECIFIED: ICD-10-CM

## 2020-12-14 DIAGNOSIS — J02.9 ACUTE PHARYNGITIS, UNSPECIFIED: ICD-10-CM

## 2020-12-14 PROCEDURE — 99203 OFFICE O/P NEW LOW 30 MIN: CPT

## 2020-12-14 RX ORDER — ELECTROLYTES/DEXTROSE
SOLUTION, ORAL ORAL
Refills: 0 | Status: ACTIVE | COMMUNITY

## 2020-12-14 NOTE — HISTORY OF PRESENT ILLNESS
[de-identified] : 35 year old female initial visit for throat soreness, associated symptoms of odynophagia and discomfort when swallowing, no dysphagia or choking liquids/solids. also says she has been 'tiring' out her voice lately.  States symptoms present for the past year. previous US thyroid shows midly enlarged thyroid w no nodules. per patient, previous thyroid function tests wnl. never been diagnosed with reflux, no medications at home. no signs or symptoms of hypo or hyperthyroidism.

## 2020-12-14 NOTE — REASON FOR VISIT
[Initial Evaluation] : an initial evaluation for [Other: _____] : [unfilled] [FreeTextEntry2] : initial visit for throat soreness

## 2021-01-08 DIAGNOSIS — J02.9 ACUTE PHARYNGITIS, UNSPECIFIED: ICD-10-CM

## 2021-01-08 DIAGNOSIS — R13.10 DYSPHAGIA, UNSPECIFIED: ICD-10-CM

## 2021-04-05 ENCOUNTER — APPOINTMENT (OUTPATIENT)
Dept: OTOLARYNGOLOGY | Facility: CLINIC | Age: 36
End: 2021-04-05

## 2022-04-16 ENCOUNTER — EMERGENCY (EMERGENCY)
Facility: HOSPITAL | Age: 37
LOS: 0 days | Discharge: HOME | End: 2022-04-16
Attending: EMERGENCY MEDICINE
Payer: MEDICAID

## 2022-04-16 VITALS
OXYGEN SATURATION: 100 % | TEMPERATURE: 98 F | DIASTOLIC BLOOD PRESSURE: 80 MMHG | RESPIRATION RATE: 18 BRPM | WEIGHT: 130.07 LBS | HEART RATE: 72 BPM | SYSTOLIC BLOOD PRESSURE: 105 MMHG | HEIGHT: 67 IN

## 2022-04-16 DIAGNOSIS — R05.8 OTHER SPECIFIED COUGH: ICD-10-CM

## 2022-04-16 DIAGNOSIS — J06.9 ACUTE UPPER RESPIRATORY INFECTION, UNSPECIFIED: ICD-10-CM

## 2022-04-16 PROCEDURE — 99284 EMERGENCY DEPT VISIT MOD MDM: CPT

## 2022-04-16 PROCEDURE — 71046 X-RAY EXAM CHEST 2 VIEWS: CPT | Mod: 26

## 2022-04-16 PROCEDURE — 99283 EMERGENCY DEPT VISIT LOW MDM: CPT | Mod: 25

## 2022-04-16 PROCEDURE — 99053 MED SERV 10PM-8AM 24 HR FAC: CPT

## 2022-04-16 PROCEDURE — 71046 X-RAY EXAM CHEST 2 VIEWS: CPT

## 2022-04-16 RX ORDER — PHENYLEPHRINE HCL, BROMPHENIRAMINE MALEATE 5; 2 MG/10ML; MG/10ML
10 SOLUTION ORAL
Qty: 250 | Refills: 0
Start: 2022-04-16

## 2022-04-16 NOTE — ED PROVIDER NOTE - CLINICAL SUMMARY MEDICAL DECISION MAKING FREE TEXT BOX
cough, normal CXR.  likely viral. pt reassured.  will rx cough medicine.  Supportive measures and return precautions discussed.

## 2022-04-16 NOTE — ED ADULT TRIAGE NOTE - CHIEF COMPLAINT QUOTE
complains of persistent non productive dry cough over past 2 months, sometimes causing sore throat. denies fever or shortness of breath.

## 2022-04-16 NOTE — ED PROVIDER NOTE - OBJECTIVE STATEMENT
intermittent cough x 8 weeks.  worse in the past 5 nights, difficult to sleep.  reports everyone else at home also coughing.  no f/c/n/v/d.  no h/o RAD.  no CP or SOB.  taking OTC meds without relief

## 2022-04-16 NOTE — ED PROVIDER NOTE - NSFOLLOWUPINSTRUCTIONS_ED_ALL_ED_FT
Plenty of rest  Plenty of fluids  hot tea with lemon and honey  Bromphed for cough.  Anything worsens or persists, return to ER for further care and evaluation.

## 2022-04-16 NOTE — ED PROVIDER NOTE - PATIENT PORTAL LINK FT
You can access the FollowMyHealth Patient Portal offered by [facility] by registering at the following website: [email or web address]. By joining [facility]’s FollowMyHealth portal, you will also be able to view your health information using other applications (apps) compatible with our system.

## 2022-04-16 NOTE — ED ADULT NURSE NOTE - OBJECTIVE STATEMENT
Pt A&Ox3, presents to the ED c/o nonproductive cough x 2 months. Pt notes sore throat. Denies fevers, CP, SOB. No medication PTA.

## 2022-04-16 NOTE — ED ADULT NURSE NOTE - NSFALLRSKHARMRISK_ED_ALL_ED
no Detail Level: Simple Comment: Bx’ed 6/26/19, site A Comment: [facility] 5/31/2019 Comment: Clinical dx Comment: Treated w/Mohs Comment: Bx’d 4/22/19 (site A); positive to the peripheral margin Comment: Bx’d 5/31/19

## 2022-04-16 NOTE — ED ADULT NURSE NOTE - NSSEPSISSUSPECTED_ED_A_ED
No PA note: LAC repaired by Dr. Simon. Patient re-examined and re-evaluated. Patient feels much better at this time. ED evaluation, Diagnosis and management discussed with mom in detail. Workup results discussed with ED attending, OK to dc home. Close PMD follow up encouraged.  Strict ED return instructions discussed in detail and mom given the opportunity to ask any questions about their discharge diagnosis and instructions. Mom verbalized understanding. ~ Nito Barton PA-C

## 2022-05-06 ENCOUNTER — APPOINTMENT (OUTPATIENT)
Dept: RHEUMATOLOGY | Facility: HOSPITAL | Age: 37
End: 2022-05-06

## 2022-05-07 PROBLEM — Z78.9 OTHER SPECIFIED HEALTH STATUS: Chronic | Status: ACTIVE | Noted: 2022-04-16

## 2023-01-13 ENCOUNTER — APPOINTMENT (OUTPATIENT)
Dept: RHEUMATOLOGY | Facility: HOSPITAL | Age: 38
End: 2023-01-13

## 2023-01-25 ENCOUNTER — EMERGENCY (EMERGENCY)
Facility: HOSPITAL | Age: 38
LOS: 0 days | Discharge: HOME | End: 2023-01-25
Attending: EMERGENCY MEDICINE
Payer: MEDICAID

## 2023-01-25 VITALS
TEMPERATURE: 98 F | HEART RATE: 59 BPM | RESPIRATION RATE: 18 BRPM | SYSTOLIC BLOOD PRESSURE: 112 MMHG | OXYGEN SATURATION: 100 % | DIASTOLIC BLOOD PRESSURE: 57 MMHG

## 2023-01-25 VITALS — WEIGHT: 134.92 LBS | HEIGHT: 66 IN

## 2023-01-25 DIAGNOSIS — M54.2 CERVICALGIA: ICD-10-CM

## 2023-01-25 DIAGNOSIS — S09.90XA UNSPECIFIED INJURY OF HEAD, INITIAL ENCOUNTER: ICD-10-CM

## 2023-01-25 DIAGNOSIS — R51.9 HEADACHE, UNSPECIFIED: ICD-10-CM
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
## 2023-01-25 DIAGNOSIS — S30.0XXA CONTUSION OF LOWER BACK AND PELVIS, INITIAL ENCOUNTER: ICD-10-CM

## 2023-01-25 DIAGNOSIS — M54.6 PAIN IN THORACIC SPINE: ICD-10-CM
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
## 2023-01-25 PROCEDURE — 70450 CT HEAD/BRAIN W/O DYE: CPT | Mod: MA

## 2023-01-25 PROCEDURE — 72131 CT LUMBAR SPINE W/O DYE: CPT | Mod: 26,MA

## 2023-01-25 PROCEDURE — 72125 CT NECK SPINE W/O DYE: CPT | Mod: 26,MA

## 2023-01-25 PROCEDURE — 99284 EMERGENCY DEPT VISIT MOD MDM: CPT

## 2023-01-25 PROCEDURE — 70450 CT HEAD/BRAIN W/O DYE: CPT | Mod: 26,MA

## 2023-01-25 PROCEDURE — 72131 CT LUMBAR SPINE W/O DYE: CPT | Mod: MA

## 2023-01-25 PROCEDURE — 72125 CT NECK SPINE W/O DYE: CPT | Mod: MA

## 2023-01-25 PROCEDURE — 99284 EMERGENCY DEPT VISIT MOD MDM: CPT | Mod: 25

## 2023-01-25 RX ORDER — CYCLOBENZAPRINE HYDROCHLORIDE 10 MG/1
1 TABLET, FILM COATED ORAL
Qty: 30 | Refills: 0
Start: 2023-01-25

## 2023-01-25 RX ORDER — CYCLOBENZAPRINE HYDROCHLORIDE 10 MG/1
10 TABLET, FILM COATED ORAL ONCE
Refills: 0 | Status: COMPLETED | OUTPATIENT
Start: 2023-01-25 | End: 2023-01-25

## 2023-01-25 RX ADMIN — CYCLOBENZAPRINE HYDROCHLORIDE 10 MILLIGRAM(S): 10 TABLET, FILM COATED ORAL at 22:16

## 2023-01-25 NOTE — ED STATDOCS - NSFOLLOWUPINSTRUCTIONS_ED_ALL_ED_FT
Return to the Emergency Department for worsening or persistent symptoms, and/or ANY NEW OR CONCERNING SYMPTOMS. If you have issues obtaining follow up, please call: [phone] or [phone]  to obtain a doctor or specialist who takes your insurance in your area.      Head Injury, Adult       There are many types of head injuries. Head injuries can be as minor as a small bump, or they can be a serious medical issue. More severe head injuries include:  •A jarring injury to the brain (concussion).      •A bruise (contusion) of the brain. This means there is bleeding in the brain that can cause swelling.      •A cracked skull (skull fracture).      •Bleeding in the brain that collects, clots, and forms a bump (hematoma).      After a head injury, most problems occur within the first 24 hours, but side effects may occur up to 7–10 days after the injury. It is important to watch your condition for any changes. You may need to be observed in the emergency department or urgent care, or you may be admitted to the hospital.      What are the causes?    There are many possible causes of a head injury. Serious head injuries may be caused by car accidents, bicycle or motorcycle accidents, sports injuries, falls, or being struck by an object.      What are the symptoms?    Symptoms of a head injury include a contusion, bump, or bleeding at the site of the injury. Other physical symptoms may include:  •Headache.      •Nausea or vomiting.      •Dizziness.      •Blurred or double vision.      •Being uncomfortable around bright lights or loud noises.      •Seizures.      •Feeling tired.      •Trouble being awakened.      •Loss of consciousness.      Mental or emotional symptoms may include:  •Irritability.      •Confusion and memory problems.      •Poor attention and concentration.      •Changes in eating or sleeping habits.      •Anxiety or depression.        How is this diagnosed?    This condition can usually be diagnosed based on your symptoms, a description of the injury, and a physical exam. You may also have imaging tests done, such as a CT scan or an MRI.      How is this treated?    Treatment for this condition depends on the severity and type of injury you have. The main goal of treatment is to prevent complications and allow the brain time to heal.    Mild head injury     If you have a mild head injury, you may be sent home, and treatment may include:  •Observation. A responsible adult should stay with you for 24 hours after your injury and check on you often.      •Physical rest.      •Brain rest.      •Pain medicines.      Severe head injury    If you have a severe head injury, treatment may include:•Close observation. This includes hospitalization with the following care:  •Frequent physical exams.      •Frequent checks of how your brain and nervous system are working (neurological status).      •Checking your blood pressure and oxygen levels.        •Medicines to relieve pain, prevent seizures, and decrease brain swelling.      •Airway protection and breathing support. This may include using a ventilator.      •Treatments that monitor and manage swelling inside the brain.    •Brain surgery. This may be needed to:  •Remove a collection of blood or blood clots.      •Stop the bleeding.      •Remove a part of the skull to allow room for the brain to swell.          Follow these instructions at home:    Activity     •Rest and avoid activities that are physically hard or tiring.      •Make sure you get enough sleep.    •Let your brain rest by limiting activities that require a lot of thought or attention, such as:  •Watching TV.      •Playing memory games and puzzles.      •Job-related work or homework.      •Working on the computer, using social media, and texting.        •Avoid activities that could cause another head injury, such as playing sports, until your health care provider approves. Having another head injury, especially before the first one has healed, can be dangerous.      •Ask your health care provider when it is safe for you to return to your regular activities, including work or school. Ask your health care provider for a step-by-step plan for gradually returning to activities.      •Ask your health care provider when you can drive, ride a bicycle, or use heavy machinery. Your ability to react may be slower after a brain injury. Do not do these activities if you are dizzy.        Lifestyle      • Do not drink alcohol until your health care provider approves. Do not use drugs. Alcohol and certain drugs may slow your recovery and can put you at risk of further injury.      •If it is harder than usual to remember things, write them down.      •If you are easily distracted, try to do one thing at a time.      •Talk with family members or close friends when making important decisions.      •Tell your friends, family, a trusted colleague, and  about your injury, symptoms, and restrictions. Have them watch for any new or worsening problems.      General instructions     •Take over-the-counter and prescription medicines only as told by your health care provider.      •Have someone stay with you for 24 hours after your head injury. This person should watch you for any changes in your symptoms and be ready to seek medical help.      •Keep all follow-up visits as told by your health care provider. This is important.        How is this prevented?    •Work on improving your balance and strength to avoid falls.      •Wear a seat belt when you are in a moving vehicle.      •Wear a helmet when riding a bicycle, skiing, or doing any other sport or activity that has a risk of injury.    •If you drink alcohol:•Limit how much you use to:  •0–1 drink a day for nonpregnant women.      •0–2 drinks a day for men.        •Be aware of how much alcohol is in your drink. In the [place] one drink equals one 12 oz bottle of beer (355 mL), one 5 oz glass of wine (148 mL), or one 1½ oz glass of hard liquor (44 mL).      •Take safety measures in your home, such as:  •Removing clutter and tripping hazards from floors and stairways.      •Using grab bars in bathrooms and handrails by stairs.      •Placing non-slip mats on floors and in bathtubs.      •Improving lighting in dim areas.          Where to find more information    •Centers for Disease Control and Prevention: www.cdc.gov        Get help right away if:  •You have:  •A severe headache that is not helped by medicine.      •Trouble walking or weakness in your arms and legs.      •Clear or bloody fluid coming from your nose or ears.      •Changes in your vision.      •A seizure.      •Increased confusion or irritability.        •Your symptoms get worse.      •You are sleepier than normal and have trouble staying awake.      •You lose your balance.      •Your pupils change size.      •Your speech is slurred.      •Your dizziness gets worse.      •You vomit.      These symptoms may represent a serious problem that is an emergency. Do not wait to see if the symptoms will go away. Get medical help right away. Call your local emergency services (911 in the [place] Do not drive yourself to the hospital.       Summary    •Head injuries can be minor, or they can be a serious medical issue requiring immediate attention.      •Treatment for this condition depends on the severity and type of injury you have.      •Have someone stay with you for 24 hours after your injury and check on you often.      •Ask your health care provider when it is safe for you to return to your regular activities, including work or school.      •Head injury prevention includes wearing a seat belt in a motor vehicle, using a helmet on a bicycle, limiting alcohol use, and taking safety measures in your home.      This information is not intended to replace advice given to you by your health care provider. Make sure you discuss any questions you have with your health care provider.

## 2023-01-25 NOTE — ED STATDOCS - OBJECTIVE STATEMENT
37F no PMH p/w fall down stairs. Pt slipped down 6 steps, carpeted. Slipped backwards and hit back of head, had brief LOC for 1-2 seconds per pt. Also hit lower back. Did not flip over down the stairs. Was able to stand up and ambulate slowly. Neck pain does not radiate to UE, LBP does not radiate to LE. No paresthesias. Able to ambulate but with pain. now with frontal HA, no vision changes, no vomiting

## 2023-01-25 NOTE — ED ADULT NURSE NOTE - NSFALLRSKASSESSTYPE_ED_ALL_ED
1. Essential hypertension  EKG 12 lead   2. Left ventricular dysfunction     3. ESPINOSA (dyspnea on exertion)         PLAN:  Orders Placed This Encounter   Procedures    EKG 12 lead     No orders of the defined types were placed in this encounter. 1. Continue present medications  2. We'll reschedule pharmacologic stress test with myocardial perfusion imaging Lexiscan  3. Recommend follow-up assessment in one month  4. Recommend proBNP level. Return in about 4 weeks (around 11/29/2018) for 5/6 weeks . Emani Valverde MD 11/1/2018 2:51 PM    [facility]      Thisdictation was generated by voice recognition computer software. Although all attempts are made to edit the dictation for accuracy, there may be errors in the transcription that are not intended. Initial (On Arrival)

## 2023-01-25 NOTE — ED STATDOCS - PATIENT PORTAL LINK FT
You can access the FollowMyHealth Patient Portal offered by [facility] by registering at the following website: [email or web address]. By joining [facility]’s FollowMyHealth portal, you will also be able to view your health information using other applications (apps) compatible with our system.

## 2023-01-25 NOTE — ED ADULT NURSE NOTE - NSIMPLEMENTINTERV_GEN_ALL_ED
Implemented All Universal Safety Interventions:  [place] to call system. Call bell, personal items and telephone within reach. Instruct patient to call for assistance. Room bathroom lighting operational. Non-slip footwear when patient is off stretcher. Physically safe environment: no spills, clutter or unnecessary equipment. Stretcher in lowest position, wheels locked, appropriate side rails in place.

## 2023-01-25 NOTE — ED STATDOCS - PHYSICAL EXAMINATION
General: AAOx3, NAD  HEENT: NCAT. No hematoma/ecchymosis. +midline and bilateral paraspinal cervical ttp.   Cardiac: Normal rate and rhythym, no murmurs, normal peripheral perfusion  Respiratory: Normal rate and effort. CTAB  GI: Soft, nondistended, nontender  Neuro: No focal deficits. DOHERTY equally x4, sensation to light touch intact throughout  MSK: FROMx4. +midline upper lumbar ttp, small area of ecchymosis. No stepoffs or deformities. No extremity tenderness  Skin: No rash

## 2023-01-25 NOTE — ED ADULT NURSE NOTE - OBJECTIVE STATEMENT
37 female awake alert and oriented x4 presents to ED s/p fall down 6 steps. Slipped backward and hit head. + LOC. ambulate with steady gait. c/o neck pain. Denies headache, nausea, vomiting, dizziness.

## 2023-01-25 NOTE — ED ADULT TRIAGE NOTE - CHIEF COMPLAINT QUOTE
patient presenting ambulatory to ED s/p fall down 6 steps PTA. patient states she hit head twice on carpeted steps on the way down. patient landed on carpeted area. patient endorsing brief LOC, denies blood thinners. patient endorsing neck pain and lower back pain. patient able to ambulate at baseline.

## 2023-03-24 ENCOUNTER — APPOINTMENT (OUTPATIENT)
Dept: RHEUMATOLOGY | Facility: HOSPITAL | Age: 38
End: 2023-03-24

## 2023-05-18 NOTE — ED PROVIDER NOTE - WR INTERPRETATION DATE TIME  1
It was a pleasure taking care of you today.  I've included a brief summary of our discussion and care plan from today's visit below.  Please review this information with your primary care provider.  _______________________________________________________________________     My recommendations are summarized as follows:     - try making some further lifestyle changes to help reflux symptoms such as elevating head of bed with bed blocks.   - increase protonix to 40mg daily. Best taken 30 minutes before breakfast on an empty stomach  - schedule a lab appointment for blood work  - follow up yearly, sooner if needed     ______________________________________________________________________     How do I schedule labs, imaging studies, or procedures that were ordered in clinic today?      Labs: To schedule lab appointment you can contact your local [facility] or call [phone] to schedule at any convenient [facility] location.     Procedures: If a colonoscopy, upper endoscopy, breath test, esophageal manometry, or pH impedence was ordered today, our endoscopy team will call you to schedule this. If you have not heard from our endoscopy team within a week, please call [phone] to schedule.      Imaging Studies: If you were scheduled for a CT scan, X-ray, MRI, ultrasound, HIDA scan or other imaging study, please call [phone] to have this scheduled.      Referral: If a referral to another specialty was ordered, expect a phone call or follow instructions above. If you have not heard from anyone regarding your referral in a week, please call our clinic to check the status.      Who do I call with any questions after my visit?  Please be in touch if there are any further questions that arise following today's visit.  There are multiple ways to contact your gastroenterology care team.       During business hours, you may reach a Gastroenterology nurse at [phone]     To schedule or reschedule an  appointment, please call [phone].      You can always send a secure message through [employer].  [employer] messages are answered by your nurse or doctor typically within 24 hours.  Please allow extra time on weekends and holidays.       For urgent/emergent questions after business hours, you may reach the on-call GI Fellow by contacting the [facility]  at [phone].     How will I get the results of any tests ordered?    You will receive all of your results.  If you have signed up for [employer], any tests ordered at your visit will be available to you after your physician reviews them.  Typically this takes 1-2 weeks.  If there are urgent results that require a change in your care plan, your physician or nurse will call you to discuss the next steps.       What is [employer]?  [employer] is a secure way for you to access all of your healthcare records from the [facility].  It is a web based computer program, so you can sign on to it from any location.  It also allows you to send secure messages to your care team.  I recommend signing up for [employer] access if you have not already done so and are comfortable with using a computer.       How to I schedule a follow-up visit?  If you did not schedule a follow-up visit today, please call [phone] to schedule a follow-up office visit.      Mundo Chavez PA-C  Division of Gastroenterology, Hepatology and Nutrition   [facility]      16-Apr-2022 23:35

## 2023-06-02 ENCOUNTER — APPOINTMENT (OUTPATIENT)
Dept: RHEUMATOLOGY | Facility: HOSPITAL | Age: 38
End: 2023-06-02

## 2023-10-18 ENCOUNTER — APPOINTMENT (OUTPATIENT)
Dept: RADIOLOGY | Facility: CLINIC | Age: 38
End: 2023-10-18
Payer: COMMERCIAL

## 2023-10-18 ENCOUNTER — APPOINTMENT (OUTPATIENT)
Dept: ULTRASOUND IMAGING | Facility: CLINIC | Age: 38
End: 2023-10-18

## 2023-10-18 ENCOUNTER — OUTPATIENT (OUTPATIENT)
Dept: OUTPATIENT SERVICES | Facility: HOSPITAL | Age: 38
LOS: 1 days | End: 2023-10-18
Payer: COMMERCIAL

## 2023-10-18 ENCOUNTER — APPOINTMENT (OUTPATIENT)
Dept: RADIOLOGY | Facility: CLINIC | Age: 38
End: 2023-10-18

## 2023-10-18 DIAGNOSIS — M25.569 PAIN IN UNSPECIFIED KNEE: ICD-10-CM

## 2023-10-18 PROCEDURE — 73564 X-RAY EXAM KNEE 4 OR MORE: CPT

## 2023-10-18 PROCEDURE — 73564 X-RAY EXAM KNEE 4 OR MORE: CPT | Mod: 26,50

## 2023-10-25 ENCOUNTER — RESULT REVIEW (OUTPATIENT)
Age: 38
End: 2023-10-25

## 2023-11-03 ENCOUNTER — APPOINTMENT (OUTPATIENT)
Dept: ULTRASOUND IMAGING | Facility: CLINIC | Age: 38
End: 2023-11-03

## 2023-12-07 ENCOUNTER — OUTPATIENT (OUTPATIENT)
Dept: OUTPATIENT SERVICES | Facility: HOSPITAL | Age: 38
LOS: 1 days | End: 2023-12-07
Payer: COMMERCIAL

## 2023-12-07 ENCOUNTER — APPOINTMENT (OUTPATIENT)
Dept: ULTRASOUND IMAGING | Facility: CLINIC | Age: 38
End: 2023-12-07
Payer: COMMERCIAL

## 2023-12-07 DIAGNOSIS — E02 SUBCLINICAL IODINE-DEFICIENCY HYPOTHYROIDISM: ICD-10-CM

## 2023-12-07 DIAGNOSIS — N60.01 SOLITARY CYST OF RIGHT BREAST: ICD-10-CM

## 2023-12-07 PROCEDURE — 76641 ULTRASOUND BREAST COMPLETE: CPT | Mod: 26,50

## 2023-12-07 PROCEDURE — 76641 ULTRASOUND BREAST COMPLETE: CPT

## 2023-12-07 PROCEDURE — 76536 US EXAM OF HEAD AND NECK: CPT | Mod: 26

## 2023-12-07 PROCEDURE — 76536 US EXAM OF HEAD AND NECK: CPT

## 2024-02-15 ENCOUNTER — EMERGENCY (EMERGENCY)
Facility: HOSPITAL | Age: 39
LOS: 0 days | Discharge: HOME | End: 2024-02-15
Attending: EMERGENCY MEDICINE
Payer: MEDICAID

## 2024-02-15 VITALS
DIASTOLIC BLOOD PRESSURE: 80 MMHG | SYSTOLIC BLOOD PRESSURE: 114 MMHG | OXYGEN SATURATION: 100 % | HEART RATE: 82 BPM | RESPIRATION RATE: 18 BRPM | TEMPERATURE: 102 F

## 2024-02-15 VITALS — WEIGHT: 134.92 LBS | HEIGHT: 66 IN

## 2024-02-15 DIAGNOSIS — R07.0 PAIN IN THROAT: ICD-10-CM

## 2024-02-15 DIAGNOSIS — Z20.822 CONTACT WITH AND (SUSPECTED) EXPOSURE TO COVID-19: ICD-10-CM

## 2024-02-15 DIAGNOSIS — R52 PAIN, UNSPECIFIED: ICD-10-CM

## 2024-02-15 DIAGNOSIS — B34.9 VIRAL INFECTION, UNSPECIFIED: ICD-10-CM

## 2024-02-15 DIAGNOSIS — R50.9 FEVER, UNSPECIFIED: ICD-10-CM

## 2024-02-15 LAB
FLUAV AG NPH QL: DETECTED
FLUBV AG NPH QL: SIGNIFICANT CHANGE UP
RSV RNA NPH QL NAA+NON-PROBE: SIGNIFICANT CHANGE UP
S PYO AG SPEC QL IA: POSITIVE
SARS-COV-2 RNA SPEC QL NAA+PROBE: SIGNIFICANT CHANGE UP

## 2024-02-15 PROCEDURE — 99283 EMERGENCY DEPT VISIT LOW MDM: CPT

## 2024-02-15 PROCEDURE — 0241U: CPT

## 2024-02-15 PROCEDURE — 87880 STREP A ASSAY W/OPTIC: CPT

## 2024-02-15 PROCEDURE — 99284 EMERGENCY DEPT VISIT MOD MDM: CPT

## 2024-02-15 RX ORDER — ACETAMINOPHEN 500 MG
1000 TABLET ORAL ONCE
Refills: 0 | Status: COMPLETED | OUTPATIENT
Start: 2024-02-15 | End: 2024-02-15

## 2024-02-15 RX ORDER — DEXAMETHASONE 0.5 MG/5ML
6 ELIXIR ORAL ONCE
Refills: 0 | Status: COMPLETED | OUTPATIENT
Start: 2024-02-15 | End: 2024-02-15

## 2024-02-15 RX ADMIN — Medication 1000 MILLIGRAM(S): at 22:52

## 2024-02-15 RX ADMIN — Medication 6 MILLIGRAM(S): at 22:53

## 2024-02-15 RX ADMIN — Medication 100 MILLIGRAM(S): at 22:56

## 2024-02-15 NOTE — ED STATDOCS - ENMT, MLM
Nasal mucosa clear.  Mouth with normal mucosa, congested. Throat has no vesicles, no oropharyngeal exudates and uvula is midline.

## 2024-02-15 NOTE — ED STATDOCS - CLINICAL SUMMARY MEDICAL DECISION MAKING FREE TEXT BOX
In summary this is a in summary this 38-year-old female who presents with flulike symptoms.  Febrile to 101.9 in department.  Physical exam is largely unremarkable with exception of some nasal congestion.  Will check flu COVID, strep given recent sick contacts.  Will give medications for symptomatic control.  Will discharge home in good condition with supportive care, will call patient if either swab is positive and treat accordingly.  Recommend close follow-up PCP as needed.  Strict turn precaution given for any worsening.  Patient verbalized understanding agrees plan this time. Negative/Unknown

## 2024-02-15 NOTE — ED STATDOCS - OBJECTIVE STATEMENT
39 y/o female with no pertinent PMHx presents to the ED c/o sharp throat pain. Has been taking Tylenol for pain, last dose yesterday. Endorses fever, chills, body aches for 4 days. 2 kids at home are sick, one had flu, one had strep throat. NKDA.

## 2024-02-15 NOTE — ED STATDOCS - PATIENT PORTAL LINK FT
You can access the FollowMyHealth Patient Portal offered by [facility] by registering at the following website: [email or web address]. By joining [employer]’s FollowMyHealth portal, you will also be able to view your health information using other applications (apps) compatible with our system.

## 2024-02-15 NOTE — ED ADULT TRIAGE NOTE - CHIEF COMPLAINT QUOTE
pt presents to Ed with complaints of flu like symptoms. pt endorses fevers (tmax 103.0), body aches, chills, and throat pain. recent sick contacts with children who had flu and strep throat.

## 2024-02-15 NOTE — ED STATDOCS - NSFOLLOWUPINSTRUCTIONS_ED_ALL_ED_FT
Take tylenol or motrin as needed for pain, fever.    Medication sent to pharmacy, take as directed.    If your viral swab or strep swab come back positive we will call you and let you know results, and/or send new medication to your pharmacy.    Follow up with your doctor in the next week.    If any worsening, you can return to the ED.

## 2024-02-15 NOTE — ED ADULT TRIAGE NOTE - MODE OF ARRIVAL
Walk in Continue Regimen: CeraVe healing ointment Detail Level: Zone Discontinue Regimen: Hydrocortisone cream as the skin in thinning

## 2024-02-16 ENCOUNTER — APPOINTMENT (OUTPATIENT)
Dept: RHEUMATOLOGY | Facility: HOSPITAL | Age: 39
End: 2024-02-16
Payer: COMMERCIAL

## 2024-02-16 ENCOUNTER — OUTPATIENT (OUTPATIENT)
Dept: OUTPATIENT SERVICES | Facility: HOSPITAL | Age: 39
LOS: 1 days | End: 2024-02-16
Payer: SELF-PAY

## 2024-02-16 VITALS
BODY MASS INDEX: 21.69 KG/M2 | SYSTOLIC BLOOD PRESSURE: 116 MMHG | HEIGHT: 66 IN | TEMPERATURE: 98.6 F | WEIGHT: 135 LBS | HEART RATE: 76 BPM | RESPIRATION RATE: 16 BRPM | DIASTOLIC BLOOD PRESSURE: 79 MMHG

## 2024-02-16 DIAGNOSIS — M25.561 PAIN IN RIGHT KNEE: ICD-10-CM

## 2024-02-16 DIAGNOSIS — R76.8 OTHER SPECIFIED ABNORMAL IMMUNOLOGICAL FINDINGS IN SERUM: ICD-10-CM

## 2024-02-16 DIAGNOSIS — J02.0 STREPTOCOCCAL PHARYNGITIS: ICD-10-CM

## 2024-02-16 DIAGNOSIS — M25.562 PAIN IN RIGHT KNEE: ICD-10-CM

## 2024-02-16 DIAGNOSIS — M06.9 RHEUMATOID ARTHRITIS, UNSPECIFIED: ICD-10-CM

## 2024-02-16 DIAGNOSIS — G89.29 PAIN IN RIGHT KNEE: ICD-10-CM

## 2024-02-16 LAB
APPEARANCE UR: CLEAR — SIGNIFICANT CHANGE UP
BACTERIA # UR AUTO: NEGATIVE /HPF — SIGNIFICANT CHANGE UP
BILIRUB UR-MCNC: NEGATIVE — SIGNIFICANT CHANGE UP
C3 SERPL-MCNC: 121 MG/DL — SIGNIFICANT CHANGE UP (ref 81–157)
C4 SERPL-MCNC: 39 MG/DL — SIGNIFICANT CHANGE UP (ref 13–39)
CAST: 0 /LPF — SIGNIFICANT CHANGE UP (ref 0–4)
COLOR SPEC: YELLOW — SIGNIFICANT CHANGE UP
CREAT ?TM UR-MCNC: 115 MG/DL — SIGNIFICANT CHANGE UP
DIFF PNL FLD: NEGATIVE — SIGNIFICANT CHANGE UP
GLUCOSE UR QL: NEGATIVE MG/DL — SIGNIFICANT CHANGE UP
KETONES UR-MCNC: ABNORMAL MG/DL
LEUKOCYTE ESTERASE UR-ACNC: NEGATIVE — SIGNIFICANT CHANGE UP
NITRITE UR-MCNC: NEGATIVE — SIGNIFICANT CHANGE UP
PH UR: 6.5 — SIGNIFICANT CHANGE UP (ref 5–8)
PROT ?TM UR-MCNC: 24 MG/DL — HIGH (ref 0–12)
PROT UR-MCNC: SIGNIFICANT CHANGE UP MG/DL
PROT/CREAT UR-RTO: 0.2 RATIO — SIGNIFICANT CHANGE UP (ref 0–0.2)
RBC CASTS # UR COMP ASSIST: 0 /HPF — SIGNIFICANT CHANGE UP (ref 0–4)
SP GR SPEC: 1.02 — SIGNIFICANT CHANGE UP (ref 1–1.03)
SQUAMOUS # UR AUTO: 2 /HPF — SIGNIFICANT CHANGE UP (ref 0–5)
UROBILINOGEN FLD QL: 0.2 MG/DL — SIGNIFICANT CHANGE UP (ref 0.2–1)
WBC UR QL: 0 /HPF — SIGNIFICANT CHANGE UP (ref 0–5)

## 2024-02-16 PROCEDURE — 81001 URINALYSIS AUTO W/SCOPE: CPT

## 2024-02-16 PROCEDURE — 86200 CCP ANTIBODY: CPT

## 2024-02-16 PROCEDURE — G0463: CPT

## 2024-02-16 PROCEDURE — 86160 COMPLEMENT ANTIGEN: CPT

## 2024-02-16 PROCEDURE — ZZZZZ: CPT | Mod: GC

## 2024-02-16 PROCEDURE — 86225 DNA ANTIBODY NATIVE: CPT

## 2024-02-16 PROCEDURE — 86235 NUCLEAR ANTIGEN ANTIBODY: CPT

## 2024-02-16 PROCEDURE — 84156 ASSAY OF PROTEIN URINE: CPT

## 2024-02-16 PROCEDURE — 82570 ASSAY OF URINE CREATININE: CPT

## 2024-02-16 RX ORDER — AMOXICILLIN 500 MG/1
500 TABLET, FILM COATED ORAL
Qty: 20 | Refills: 0 | Status: ACTIVE | COMMUNITY
Start: 2024-02-16 | End: 1900-01-01

## 2024-02-16 NOTE — PHYSICAL EXAM
[General Appearance - Alert] : alert [General Appearance - In No Acute Distress] : in no acute distress [Sclera] : the sclera and conjunctiva were normal [PERRL With Normal Accommodation] : pupils were equal in size, round, and reactive to light [Outer Ear] : the ears and nose were normal in appearance [Neck Appearance] : the appearance of the neck was normal [Respiration, Rhythm And Depth] : normal respiratory rhythm and effort [Auscultation Breath Sounds / Voice Sounds] : lungs were clear to auscultation bilaterally [Apical Impulse] : the apical impulse was normal [Heart Sounds] : normal S1 and S2 [Murmurs] : no murmurs [Edema] : there was no peripheral edema [Abdomen Soft] : soft [Abdomen Tenderness] : non-tender [Cervical Lymph Nodes Enlarged Posterior Bilaterally] : posterior cervical [Supraclavicular Lymph Nodes Enlarged Bilaterally] : supraclavicular [Axillary Lymph Nodes Enlarged Bilaterally] : axillary [No Spinal Tenderness] : no spinal tenderness [Abnormal Walk] : normal gait [Nail Clubbing] : no clubbing  or cyanosis of the fingernails [Musculoskeletal - Swelling] : no joint swelling seen [Motor Tone] : muscle strength and tone were normal [] : no rash [No Focal Deficits] : no focal deficits [Oriented To Time, Place, And Person] : oriented to person, place, and time [Impaired Insight] : insight and judgment were intact [Affect] : the affect was normal [FreeTextEntry1] : L knee crepitus

## 2024-02-16 NOTE — HISTORY OF PRESENT ILLNESS
[Currently Experiencing] : currently [Fatigue] : fatigue [Cough] : cough [FreeTextEntry1] : This is a 38yf presenting for evaluation of LEN 1:320 speckled along with fatigue.   States she has had sore throat and cough productive of clear phlegm for the last week, with her kids also sick at home. Denies SOB.   Reports few year history of on and on mild knee pain bilaterally, worse after exercising and walking around. Denies morning stiffness or swelling. 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   Endorses alopecia (improved), dry skin but no malar rash Denies fevers, chills, weight loss, chest pain, vomiting, urinary symptoms, sicca symptoms, Raynaud's, or GERD  Surgical Hx: tubal ligation Meds: MVI, Mg  NKA  Social Hx: lives with two children and boyfriend. Is a stay at home mom. Denies toxic habits No family hx of autoimmune disease  [Anorexia] : no anorexia [Weight Loss] : no weight loss [Malaise] : no malaise [Fever] : no fever [Chills] : no chills [Depression] : no depression [Malar Facial Rash] : no malar facial rash [Skin Lesions] : no lesions [Skin Nodules] : no skin nodules [Oral Ulcers] : no oral ulcers [Dry Mouth] : no dry mouth [Dysphonia] : no dysphonia [Dysphagia] : no dysphagia [Shortness of Breath] : no shortness of breath [Chest Pain] : no chest pain [Arthralgias] : no arthralgias [Joint Swelling] : no joint swelling [Joint Warmth] : no joint warmth [Joint Deformity] : no joint deformity [Decreased ROM] : no decreased range of motion [Morning Stiffness] : no morning stiffness [Falls] : no falls [Difficulty Standing] : no difficulty standing [Difficulty Walking] : no difficulty walking [Dyspnea] : no dyspnea [Myalgias] : no myalgias [Muscle Weakness] : no muscle weakness [Muscle Spasms] : no muscle spasms [Muscle Cramping] : no muscle cramping [Visual Changes] : no visual changes [Eye Pain] : no eye pain [Eye Redness] : no eye redness [Dry Eyes] : no dry eyes

## 2024-02-16 NOTE — ASSESSMENT
[FreeTextEntry1] : This is a 38yf presenting for evaluation of LEN 1:320 speckled along with fatigue.   #Positive LEN 1:320 speckled - CBC and CMP unremarkable, negative inflammatory markers, RF negative  - Overall low suspicion for autoimmune disease given lack of findings on history or exam  - Counseled that 10-15% of the healthy population can have a positive LEN  - Will check LESLIE, dsDNA, C3, C4, SSA/SSB, CCP, U/A, and urine protein/Cr  #Strep throat - RVP positive for group A strep - Rx for 10 day course of Amoxicillin sent to pharmacy   #Influenza A  - Out of the window for tamiflu  - Symptomatically improving   #Knee pain likely degenerative  - No suspicion for inflammatory arthritis  - PT referral given   D/w Dr. Jenkins  RTC PRN   Miranda Benites MD Rheumatology Fellow

## 2024-02-16 NOTE — END OF VISIT
[] : Fellow [FreeTextEntry3] : Patient seen with Dr Benites. Fatigue and positive LEN with no suspicion for inflammatory disease. For subtyping of LEN, UA and send for PT. Incidental positive test for Influenza A and Strep throat. Amoxicillin prescribed.

## 2024-02-16 NOTE — DATA REVIEWED
[FreeTextEntry1] : 10/2023 CBC and CMP unremarkable, TSH 4.8, free T4 WNL, , cholesterol 202, vitamin D 25 28, LEN 1:320 speckled, syphilis negative, chlamydia negative, gonorrhea negative, HIV negative X-rays knees unremarkable  2022 ESR and CRP negative, RF <10 2020 TPO negative

## 2024-02-17 LAB
ANTI-RIBONUCLEAR PROTEIN: 0.3 AI — SIGNIFICANT CHANGE UP
DSDNA AB FLD-ACNC: <0.2 AI — SIGNIFICANT CHANGE UP
ENA SM AB FLD QL: <0.2 AI — SIGNIFICANT CHANGE UP
ENA SS-A AB FLD IA-ACNC: <0.2 AI — SIGNIFICANT CHANGE UP

## 2024-02-20 LAB
CCP IGG SERPL-ACNC: <8 UNITS — SIGNIFICANT CHANGE UP
DSDNA AB SER-ACNC: 19 IU/ML — SIGNIFICANT CHANGE UP
RF+CCP IGG SER-IMP: NEGATIVE — SIGNIFICANT CHANGE UP

## 2024-02-23 ENCOUNTER — NON-APPOINTMENT (OUTPATIENT)
Age: 39
End: 2024-02-23

## 2024-03-01 ENCOUNTER — TRANSCRIPTION ENCOUNTER (OUTPATIENT)
Age: 39
End: 2024-03-01

## 2024-03-28 ENCOUNTER — EMERGENCY (EMERGENCY)
Facility: HOSPITAL | Age: 39
LOS: 0 days | Discharge: HOME | End: 2024-03-28
Attending: EMERGENCY MEDICINE
Payer: MEDICAID

## 2024-03-28 VITALS
DIASTOLIC BLOOD PRESSURE: 69 MMHG | TEMPERATURE: 98 F | SYSTOLIC BLOOD PRESSURE: 109 MMHG | HEART RATE: 75 BPM | OXYGEN SATURATION: 100 % | RESPIRATION RATE: 20 BRPM

## 2024-03-28 VITALS — HEIGHT: 66 IN | WEIGHT: 134.92 LBS

## 2024-03-28 DIAGNOSIS — R05.9 COUGH, UNSPECIFIED: ICD-10-CM

## 2024-03-28 DIAGNOSIS — H66.92 OTITIS MEDIA, UNSPECIFIED, LEFT EAR: ICD-10-CM

## 2024-03-28 DIAGNOSIS — Z20.822 CONTACT WITH AND (SUSPECTED) EXPOSURE TO COVID-19: ICD-10-CM

## 2024-03-28 DIAGNOSIS — R09.89 OTHER SPECIFIED SYMPTOMS AND SIGNS INVOLVING THE CIRCULATORY AND RESPIRATORY SYSTEMS: ICD-10-CM

## 2024-03-28 DIAGNOSIS — Z86.16 PERSONAL HISTORY OF COVID-19: ICD-10-CM

## 2024-03-28 DIAGNOSIS — J02.9 ACUTE PHARYNGITIS, UNSPECIFIED: ICD-10-CM

## 2024-03-28 LAB
ALBUMIN SERPL ELPH-MCNC: 3.9 G/DL — SIGNIFICANT CHANGE UP (ref 3.3–5)
ALP SERPL-CCNC: 105 U/L — SIGNIFICANT CHANGE UP (ref 40–120)
ALT FLD-CCNC: 24 U/L — SIGNIFICANT CHANGE UP (ref 12–78)
ANION GAP SERPL CALC-SCNC: 4 MMOL/L — LOW (ref 5–17)
AST SERPL-CCNC: 18 U/L — SIGNIFICANT CHANGE UP (ref 15–37)
BASOPHILS # BLD AUTO: 0.05 K/UL — SIGNIFICANT CHANGE UP (ref 0–0.2)
BASOPHILS NFR BLD AUTO: 0.8 % — SIGNIFICANT CHANGE UP (ref 0–2)
BILIRUB SERPL-MCNC: 0.1 MG/DL — LOW (ref 0.2–1.2)
BUN SERPL-MCNC: 13 MG/DL — SIGNIFICANT CHANGE UP (ref 7–23)
CALCIUM SERPL-MCNC: 9.2 MG/DL — SIGNIFICANT CHANGE UP (ref 8.5–10.1)
CHLORIDE SERPL-SCNC: 108 MMOL/L — SIGNIFICANT CHANGE UP (ref 96–108)
CO2 SERPL-SCNC: 28 MMOL/L — SIGNIFICANT CHANGE UP (ref 22–31)
CREAT SERPL-MCNC: 0.84 MG/DL — SIGNIFICANT CHANGE UP (ref 0.5–1.3)
EGFR: 92 ML/MIN/1.73M2 — SIGNIFICANT CHANGE UP
EOSINOPHIL # BLD AUTO: 0.29 K/UL — SIGNIFICANT CHANGE UP (ref 0–0.5)
EOSINOPHIL NFR BLD AUTO: 4.5 % — SIGNIFICANT CHANGE UP (ref 0–6)
FLUAV AG NPH QL: SIGNIFICANT CHANGE UP
FLUBV AG NPH QL: SIGNIFICANT CHANGE UP
GLUCOSE SERPL-MCNC: 103 MG/DL — HIGH (ref 70–99)
HCT VFR BLD CALC: 36.8 % — SIGNIFICANT CHANGE UP (ref 34.5–45)
HGB BLD-MCNC: 11.7 G/DL — SIGNIFICANT CHANGE UP (ref 11.5–15.5)
IMM GRANULOCYTES NFR BLD AUTO: 0.2 % — SIGNIFICANT CHANGE UP (ref 0–0.9)
LYMPHOCYTES # BLD AUTO: 1.81 K/UL — SIGNIFICANT CHANGE UP (ref 1–3.3)
LYMPHOCYTES # BLD AUTO: 28.3 % — SIGNIFICANT CHANGE UP (ref 13–44)
MCHC RBC-ENTMCNC: 26.4 PG — LOW (ref 27–34)
MCHC RBC-ENTMCNC: 31.8 GM/DL — LOW (ref 32–36)
MCV RBC AUTO: 82.9 FL — SIGNIFICANT CHANGE UP (ref 80–100)
MONOCYTES # BLD AUTO: 0.62 K/UL — SIGNIFICANT CHANGE UP (ref 0–0.9)
MONOCYTES NFR BLD AUTO: 9.7 % — SIGNIFICANT CHANGE UP (ref 2–14)
NEUTROPHILS # BLD AUTO: 3.61 K/UL — SIGNIFICANT CHANGE UP (ref 1.8–7.4)
NEUTROPHILS NFR BLD AUTO: 56.5 % — SIGNIFICANT CHANGE UP (ref 43–77)
PLATELET # BLD AUTO: 271 K/UL — SIGNIFICANT CHANGE UP (ref 150–400)
POTASSIUM SERPL-MCNC: 3.6 MMOL/L — SIGNIFICANT CHANGE UP (ref 3.5–5.3)
POTASSIUM SERPL-SCNC: 3.6 MMOL/L — SIGNIFICANT CHANGE UP (ref 3.5–5.3)
PROT SERPL-MCNC: 8 GM/DL — SIGNIFICANT CHANGE UP (ref 6–8.3)
RBC # BLD: 4.44 M/UL — SIGNIFICANT CHANGE UP (ref 3.8–5.2)
RBC # FLD: 14.1 % — SIGNIFICANT CHANGE UP (ref 10.3–14.5)
RSV RNA NPH QL NAA+NON-PROBE: SIGNIFICANT CHANGE UP
SARS-COV-2 RNA SPEC QL NAA+PROBE: SIGNIFICANT CHANGE UP
SODIUM SERPL-SCNC: 140 MMOL/L — SIGNIFICANT CHANGE UP (ref 135–145)
WBC # BLD: 6.39 K/UL — SIGNIFICANT CHANGE UP (ref 3.8–10.5)
WBC # FLD AUTO: 6.39 K/UL — SIGNIFICANT CHANGE UP (ref 3.8–10.5)

## 2024-03-28 PROCEDURE — 99284 EMERGENCY DEPT VISIT MOD MDM: CPT | Mod: 25

## 2024-03-28 PROCEDURE — 71046 X-RAY EXAM CHEST 2 VIEWS: CPT | Mod: 26

## 2024-03-28 PROCEDURE — 96374 THER/PROPH/DIAG INJ IV PUSH: CPT

## 2024-03-28 PROCEDURE — 0241U: CPT

## 2024-03-28 PROCEDURE — 71046 X-RAY EXAM CHEST 2 VIEWS: CPT

## 2024-03-28 PROCEDURE — 99284 EMERGENCY DEPT VISIT MOD MDM: CPT

## 2024-03-28 PROCEDURE — 85025 COMPLETE CBC W/AUTO DIFF WBC: CPT

## 2024-03-28 PROCEDURE — 36415 COLL VENOUS BLD VENIPUNCTURE: CPT

## 2024-03-28 PROCEDURE — 80053 COMPREHEN METABOLIC PANEL: CPT

## 2024-03-28 RX ORDER — ACETAMINOPHEN 500 MG
1000 TABLET ORAL ONCE
Refills: 0 | Status: COMPLETED | OUTPATIENT
Start: 2024-03-28 | End: 2024-03-28

## 2024-03-28 RX ORDER — AMOXICILLIN 250 MG/5ML
1 SUSPENSION, RECONSTITUTED, ORAL (ML) ORAL
Qty: 8 | Refills: 0
Start: 2024-03-28 | End: 2024-03-31

## 2024-03-28 RX ORDER — SODIUM CHLORIDE 9 MG/ML
1000 INJECTION INTRAMUSCULAR; INTRAVENOUS; SUBCUTANEOUS ONCE
Refills: 0 | Status: COMPLETED | OUTPATIENT
Start: 2024-03-28 | End: 2024-03-28

## 2024-03-28 RX ADMIN — Medication 200 MILLIGRAM(S): at 23:22

## 2024-03-28 RX ADMIN — Medication 400 MILLIGRAM(S): at 22:37

## 2024-03-28 RX ADMIN — SODIUM CHLORIDE 1000 MILLILITER(S): 9 INJECTION INTRAMUSCULAR; INTRAVENOUS; SUBCUTANEOUS at 22:30

## 2024-03-28 RX ADMIN — Medication 100 MILLIGRAM(S): at 22:36

## 2024-03-28 NOTE — ED ADULT NURSE NOTE - OBJECTIVE STATEMENT
Pt is 37yo Female, A&Ox4, breathing unlabored presenting to  ED with c/o persistent cough. Pt states she has had this cough for a few weeks since having Covid-19 and it has been progressively getting worse. Pt endorses chest and back pain when coughing and feels as if she cannot take a full breath when having a coughing fit. Pt denies breathing difficulty otherwise. Pt is well appearing and sitting comfortably in stretcher.

## 2024-03-28 NOTE — ED ADULT NURSE NOTE - NSFALLUNIVINTERV_ED_ALL_ED
Bed/Stretcher in lowest position, wheels locked, appropriate side rails in place/Call bell, personal items and telephone in reach/Instruct patient to call for assistance before getting out of bed/chair/stretcher/Non-slip footwear applied when patient is off stretcher/Isabella to call system/Physically safe environment - no spills, clutter or unnecessary equipment/Purposeful proactive rounding/Room/bathroom lighting operational, light cord in reach

## 2024-03-28 NOTE — ED STATDOCS - OBJECTIVE STATEMENT
39 y/o female with no pertinent PMHx presents to the ED c/o dry cough, sore throat, congestion for 2 weeks. Endorses fever 2 days ago tmax 102. Denies urinary symptoms, vaginal bleeding. Took no medications PTA. Had covid, strep 1 month ago. PCP Dr. Jones. JORGE. Nonsmoker, no drug use.

## 2024-03-28 NOTE — ED STATDOCS - ATTENDING APP SHARED VISIT CONTRIBUTION OF CARE
I Harinder Mejia MD saw and examined the patient. MLP saw and examined the patient under my supervision. I discussed the care of the patient with MLP and agree with MLP's plan, assessment and care of the patient while in the ED.

## 2024-03-28 NOTE — ED STATDOCS - CARE PROVIDER_API CALL
Michelle Jones  [facility]  [address] [place] [address], [address]  [place] [address]  Phone: [phone]  Fax: [phone]  Follow Up Time:

## 2024-03-28 NOTE — ED STATDOCS - DIFFERENTIAL DIAGNOSIS
cough, congestion, intermittent subjective fever, no fever today in ER, recent COVID-19 and strep throat infection s/p treatment. No known hx of ashma or COPD. Xray shows no acute infiltrate in my evaluation. Pending labs, and flu and covid-19. Clinical evidence of otitis media, abx and outpatient follow up. Differential Diagnosis

## 2024-03-28 NOTE — ED STATDOCS - NS_ ATTENDINGSCRIBEDETAILS _ED_A_ED_FT
I Harinder Mejia MD saw and examined the patient. Scribe documented for me and under my supervision. I have modified the scribe's documentation where necessary to reflect my history, physical exam and other relevant documentations pertinent to the care of the patient.

## 2024-03-28 NOTE — ED STATDOCS - NSFOLLOWUPINSTRUCTIONS_ED_ALL_ED_FT
Viral Respiratory Infection  A viral respiratory infection is an illness that affects parts of the body used for breathing, like the lungs, nose, and throat. It is caused by a germ called a virus.    ImageSome examples of this kind of infection are:    A cold.  The flu (influenza).  A respiratory syncytial virus (RSV) infection.    How do I know if I have this infection?  Most of the time this infection causes:    A stuffy or runny nose.  Yellow or green fluid in the nose.  A cough.  Sneezing.  Tiredness (fatigue).  Achy muscles.  A sore throat.  Sweating or chills.  A fever.  A headache.    How is this infection treated?  If the flu is diagnosed early, it may be treated with an antiviral medicine. This medicine shortens the length of time a person has symptoms. Symptoms may be treated with over-the-counter and prescription medicines, such as:    Expectorants. These make it easier to cough up mucus.  Decongestant nasal sprays.    Doctors do not prescribe antibiotic medicines for viral infections. They do not work with this kind of infection.    How do I know if I should stay home?  To keep others from getting sick, stay home if you have:    A fever.  A lasting cough.  A sore throat.  A runny nose.  Sneezing.  Muscles aches.  Headaches.  Tiredness.  Weakness.  Chills.  Sweating.  An upset stomach (nausea).    Follow these instructions at home:  Rest as much as possible.  Take over-the-counter and prescription medicines only as told by your doctor.  Drink enough fluid to keep your pee (urine) clear or pale yellow.  Gargle with salt water. Do this 3–4 times per day or as needed. To make a salt–water mixture, dissolve ½–1 tsp of salt in 1 cup of warm water. Make sure the salt dissolves all the way.  Use nose drops made from salt water. This helps with stuffiness (congestion). It also helps soften the skin around your nose.  Do not drink alcohol.  Do not use tobacco products, including cigarettes, chewing tobacco, and e-cigarettes. If you need help quitting, ask your doctor.  Get help if:  Your symptoms last for 10 days or longer.  Your symptoms get worse over time.  You have a fever.  You have very bad pain in your face or forehead.  Parts of your jaw or neck become very swollen.  Get help right away if:  You feel pain or pressure in your chest.  You have shortness of breath.  You faint or feel like you will faint.  You keep throwing up (vomiting).  You feel confused.  This information is not intended to replace advice given to you by your health care provider. Make sure you discuss any questions you have with your health care provider. Please follow up with your primary care provider. Please note that I have provided you with the impression of the xray. Please note that I have reviewed your x-ray imaging. I have provided you with my impression. It will often take 24 to 48 hours to get the official reports of the x-ray. Please either access the x-ray reports real time by using the link provided here for patient portal access or contact the radiology number or alternatively please contact your primary care provider to get the results of the x-ray for you.     Take your antibiotics as prescribed, follow up with Dr. Jones as soon as you can, and return to us if you have any health concerns.       Viral Respiratory Infection  A viral respiratory infection is an illness that affects parts of the body used for breathing, like the lungs, nose, and throat. It is caused by a germ called a virus.    Some examples of this kind of infection are:    A cold.  The flu (influenza).  A respiratory syncytial virus (RSV) infection.    How do I know if I have this infection?  Most of the time this infection causes:    A stuffy or runny nose.  Yellow or green fluid in the nose.  A cough.  Sneezing.  Tiredness (fatigue).  Achy muscles.  A sore throat.  Sweating or chills.  A fever.  A headache.    How is this infection treated?  If the flu is diagnosed early, it may be treated with an antiviral medicine. This medicine shortens the length of time a person has symptoms. Symptoms may be treated with over-the-counter and prescription medicines, such as:    Expectorants. These make it easier to cough up mucus.  Decongestant nasal sprays.    Doctors do not prescribe antibiotic medicines for viral infections. They do not work with this kind of infection.    How do I know if I should stay home?  To keep others from getting sick, stay home if you have:    A fever.  A lasting cough.  A sore throat.  A runny nose.  Sneezing.  Muscles aches.  Headaches.  Tiredness.  Weakness.  Chills.  Sweating.  An upset stomach (nausea).    Follow these instructions at home:  Rest as much as possible.  Take over-the-counter and prescription medicines only as told by your doctor.  Drink enough fluid to keep your pee (urine) clear or pale yellow.  Gargle with salt water. Do this 3–4 times per day or as needed. To make a salt–water mixture, dissolve ½–1 tsp of salt in 1 cup of warm water. Make sure the salt dissolves all the way.  Use nose drops made from salt water. This helps with stuffiness (congestion). It also helps soften the skin around your nose.  Do not drink alcohol.  Do not use tobacco products, including cigarettes, chewing tobacco, and e-cigarettes. If you need help quitting, ask your doctor.  Get help if:  Your symptoms last for 10 days or longer.  Your symptoms get worse over time.  You have a fever.  You have very bad pain in your face or forehead.  Parts of your jaw or neck become very swollen.  Get help right away if:  You feel pain or pressure in your chest.  You have shortness of breath.  You faint or feel like you will faint.  You keep throwing up (vomiting).  You feel confused.  This information is not intended to replace advice given to you by your health care provider. Make sure you discuss any questions you have with your health care provider.

## 2024-03-28 NOTE — ED STATDOCS - PROGRESS NOTE DETAILS
39 yo female presents with cough, rhinorrhea, sore throat x 2 week. Pt states she also had a fever of 101-102F 2 days ago. Nothing was taken for her symptoms except pt tried one of her  medication which made her tired so she stopped taking it after 2 doses. Pt has not seen her pmd or urgent care during this 2 week period.    Lungs CTA b/l. Vital WNL. Likely viral. Will check cxr, flu/covid swab, and reeval. -Alden Harris PA-C Jackie BAUTISTA: Xray looks negative to my eyes. No evidence of infiltrates. VSS no hypoxia, no tachypnea, and no retractions. Patient is ambulatory. S/p IV access, hx of low LEN had recent work up that was normal, however will do blood to assess WBC, labs. Will provide IVF. Abx sent to pharmacy. If still nontoxic appearing, VSS, blood work non actionable then will dc to see Dr. Jones with strict return precautions. Pt agreeable with plan.

## 2024-03-28 NOTE — ED ADULT TRIAGE NOTE - CHIEF COMPLAINT QUOTE
Pt ambulatory to ED with c/o cough, sore throat, runny nose and fever x 1.5 week. No PMH - allergies

## 2024-03-28 NOTE — ED STATDOCS - PATIENT PORTAL LINK FT
You can access the FollowMyHealth Patient Portal offered by [facility] by registering at the following website: [email or web address]. By joining [employer]’s FollowMyHealth portal, you will also be able to view your health information using other applications (apps) compatible with our system.

## 2024-03-28 NOTE — ED STATDOCS - ENMT, MLM
Nasal mucosa clear.  Mouth with normal mucosa  Throat has no vesicles, no oropharyngeal exudates and uvula is midline. otitis media left ear

## 2024-05-07 NOTE — PATIENT PROFILE OB - BABY A: APGAR 1 MIN
[facility]  Progress Note  Name: Tayler Villalobos I  MRN: [id number]  Unit/Bed#: -01 I Date of Admission: 5/6/2024   Date of Service: 5/7/2024  Hospital Day: 1    Assessment/Plan   * Hypertensive urgency  Assessment & Plan  Elevated blood pressure to 257 systolic in ED  In association with elevated troponin   Did respond to IV push of labetalol   Resume home dose amlodipine in AM (took this morning)  Today's updated plan:  Patient has a family history of hypertension.  She reported that her blood pressure runs around 140 systolic  diastolic.  She also reported that she has whitecoat phenomena.  Patient denied any chest pain, headache, nausea, vomiting and diarrhea  Started Coreg 25 mg twice daily with chlorthalidone 12.5 mg daily  IV hydralazine 10 mg as needed  Will check TSH, intact PTH, glycohemoglobin, lipid profile and renal artery Doppler to look for stenosis    Elevated troponin  Assessment & Plan  Non chest pain associated elevation in troponin  Secondary to hypertensive urgency  Associated with sinus tachycardia, unclear etiology  Echo showed diastolic heart failure with normal EF             VTE Pharmacologic Prophylaxis: VTE Score: 1 Moderate Risk (Score 3-4) - Pharmacological DVT Prophylaxis Ordered: heparin.    Mobility:   Basic Mobility Inpatient Raw Score: 24  JH-HLM Goal: 8: Walk 250 feet or more  JH-HLM Achieved: 8: Walk 250 feet ot more  JH-HLM Goal achieved. Continue to encourage appropriate mobility.    Patient Centered Rounds: I performed bedside rounds with nursing staff today.   Discussions with Specialists or Other Care Team Provider: Care manager    Education and Discussions with Family / Patient: Patient declined call to .         Current Length of Stay: 1 day(s)  Current Patient Status: Inpatient   Certification Statement: The patient will continue to require additional inpatient hospital stay due to hypertensive urgency  Discharge Plan:  Anticipate discharge in 24-48 hrs to home.    Code Status: Level 1 - Full Code    Subjective:   Patient denied complaint of chest pain, shortness of breath.  She is little bit anxious and wants to go home .    Objective:     Vitals:   Temp (24hrs), Av.1 °F (36.7 °C), Min:98 °F (36.7 °C), Max:98.2 °F (36.8 °C)    Temp:  [98 °F (36.7 °C)-98.2 °F (36.8 °C)] 98 °F (36.7 °C)  HR:  [] 91  Resp:  [18] 18  BP: (188-205)/() 188/103  SpO2:  [96 %-99 %] 98 %  Body mass index is 28.32 kg/m².     Input and Output Summary (last 24 hours):   No intake or output data in the 24 hours ending 24 1530    Physical Exam:   Constitutional: No acute distress  HEENT: Pallor or icterus negative  CVS: S1 plus S2  Respiratory: Normal vascular breathe without crackles and wheeze  Gastroenterology: Soft nontender without any palpable mass  Skin: No bruises or ecchymosis  Neurology: No focal logical deficit      Additional Data:     Labs:  Results from last 7 days   Lab Units 24  0443 24  1216   WBC Thousand/uL 4.33 5.93   HEMOGLOBIN g/dL 11.4* 12.3   HEMATOCRIT % 37.9 41.9   PLATELETS Thousands/uL 152 140*   SEGS PCT %  --  69   LYMPHO PCT %  --  19   MONO PCT %  --  9   EOS PCT %  --  2     Results from last 7 days   Lab Units 24  0443 24  1219   SODIUM mmol/L 138 137   POTASSIUM mmol/L 3.7 3.6   CHLORIDE mmol/L 104 104   CO2 mmol/L 29 23   BUN mg/dL 12 9   CREATININE mg/dL 0.62 0.56*   ANION GAP mmol/L 5 10   CALCIUM mg/dL 10.0 10.1   ALBUMIN g/dL  --  4.5   TOTAL BILIRUBIN mg/dL  --  0.62   ALK PHOS U/L  --  93   ALT U/L  --  11   AST U/L  --  18   GLUCOSE RANDOM mg/dL 100 103                       Lines/Drains:  Invasive Devices       Peripheral Intravenous Line  Duration             Peripheral IV 24 Right Antecubital 1 day                      Telemetry:  Telemetry Orders (From admission, onward)               24 Hour Telemetry Monitoring  Continuous x 24 Hours (Telem)        Question:   Reason for 24 Hour Telemetry  Answer:  Arrhythmias requiring acute medical intervention / PPM or ICD malfunction                     Telemetry Reviewed: Normal Sinus Rhythm  Indication for Continued Telemetry Use: No indication for continued use. Will discontinue.              Imaging: Reviewed radiology reports from this admission including: chest xray did not show any infiltrate    Recent Cultures (last 7 days):         Last 24 Hours Medication List:   Current Facility-Administered Medications   Medication Dose Route Frequency Provider Last Rate    acetaminophen  650 mg Oral Q6H PRN Dev Onofre MD      amLODIPine  10 mg Oral Daily Sully Wilhelm DO      calcium carbonate  1,000 mg Oral Daily PRN Dev Onofre MD      carvedilol  25 mg Oral BID With Meals Raheem Crandall MD      chlorthalidone  12.5 mg Oral Daily Raheem Crandall MD      docusate sodium  100 mg Oral BID Dev Onofre MD      heparin (porcine)  5,000 Units Subcutaneous Q8H FREDRICK Dev Onofre MD      hydrALAZINE  10 mg Intravenous Q6H PRN Raheem Crandall MD      ondansetron  4 mg Intravenous Q6H PRN Dev Onofre MD      senna  1 tablet Oral Daily Dev Onofre MD          Today, Patient Was Seen By: Raheem Crandall MD    **Please Note: This note may have been constructed using a voice recognition system.**      9

## 2024-05-15 ENCOUNTER — RESULT REVIEW (OUTPATIENT)
Age: 39
End: 2024-05-15

## 2024-05-15 ENCOUNTER — APPOINTMENT (OUTPATIENT)
Dept: ULTRASOUND IMAGING | Facility: CLINIC | Age: 39
End: 2024-05-15
Payer: COMMERCIAL

## 2024-05-15 ENCOUNTER — OUTPATIENT (OUTPATIENT)
Dept: OUTPATIENT SERVICES | Facility: HOSPITAL | Age: 39
LOS: 1 days | End: 2024-05-15
Payer: COMMERCIAL

## 2024-05-15 DIAGNOSIS — R10.2 PELVIC AND PERINEAL PAIN: ICD-10-CM

## 2024-05-15 PROCEDURE — 76830 TRANSVAGINAL US NON-OB: CPT | Mod: 26

## 2024-05-15 PROCEDURE — 76856 US EXAM PELVIC COMPLETE: CPT | Mod: 26

## 2024-05-15 PROCEDURE — 76830 TRANSVAGINAL US NON-OB: CPT

## 2024-05-15 PROCEDURE — 76856 US EXAM PELVIC COMPLETE: CPT

## 2024-06-10 ENCOUNTER — APPOINTMENT (OUTPATIENT)
Dept: ULTRASOUND IMAGING | Facility: CLINIC | Age: 39
End: 2024-06-10

## 2024-06-10 ENCOUNTER — OUTPATIENT (OUTPATIENT)
Dept: OUTPATIENT SERVICES | Facility: HOSPITAL | Age: 39
LOS: 1 days | End: 2024-06-10

## 2024-06-10 DIAGNOSIS — Z00.8 ENCOUNTER FOR OTHER GENERAL EXAMINATION: ICD-10-CM

## 2024-07-02 ENCOUNTER — APPOINTMENT (OUTPATIENT)
Dept: ULTRASOUND IMAGING | Facility: CLINIC | Age: 39
End: 2024-07-02

## 2024-07-02 ENCOUNTER — APPOINTMENT (OUTPATIENT)
Dept: MAMMOGRAPHY | Facility: CLINIC | Age: 39
End: 2024-07-02

## 2024-07-29 ENCOUNTER — OUTPATIENT (OUTPATIENT)
Dept: OUTPATIENT SERVICES | Facility: HOSPITAL | Age: 39
LOS: 1 days | End: 2024-07-29
Payer: MEDICAID

## 2024-07-29 ENCOUNTER — APPOINTMENT (OUTPATIENT)
Dept: ULTRASOUND IMAGING | Facility: CLINIC | Age: 39
End: 2024-07-29
Payer: MEDICAID

## 2024-07-29 ENCOUNTER — APPOINTMENT (OUTPATIENT)
Dept: MAMMOGRAPHY | Facility: CLINIC | Age: 39
End: 2024-07-29
Payer: MEDICAID

## 2024-07-29 DIAGNOSIS — Z00.8 ENCOUNTER FOR OTHER GENERAL EXAMINATION: ICD-10-CM

## 2024-07-29 PROCEDURE — G0279: CPT

## 2024-07-29 PROCEDURE — G0279: CPT | Mod: 26

## 2024-07-29 PROCEDURE — 76642 ULTRASOUND BREAST LIMITED: CPT | Mod: 26,RT

## 2024-07-29 PROCEDURE — 76642 ULTRASOUND BREAST LIMITED: CPT

## 2024-07-29 PROCEDURE — 77066 DX MAMMO INCL CAD BI: CPT

## 2024-07-29 PROCEDURE — 77066 DX MAMMO INCL CAD BI: CPT | Mod: 26

## 2024-12-05 NOTE — ED PROVIDER NOTE - HIV OFFER
Pre-Admission testing appointment completed today for patient's upcoming procedure here at [facility].    PAT PASS reviewed with patient and they verbalize understanding of the following:     Do not eat or drink anything after midnight the night before procedure unless otherwise instructed by physician/surgeon's office, this includes no gum, candy, mints, tobacco products or e-cigarettes.  Do not shave the area to be operated on at least 48 hours prior to procedure.  Do not wear makeup, lotion, hair products, or nail polish.  Do not wear any jewelry and remove all piercings.  Do not wear any adhesive if you wear dentures.  Do not wear contacts; bring in glasses if needed.  Only take medications on the morning of procedure as instructed by PAT nurse per anesthesia guidelines or as instructed by physician's office.  If you are on any blood thinners reach out to the physician/surgeon's office for instructions on when/if they will need to be stopped prior to procedure.  Bring in picture ID and insurance card, advanced directive copies if applicable, CPAP/BIPAP/Inhalers if indicated morning of procedure, leave any other valuables at home.  Ensure you have arranged for someone to drive you home the day of your procedure and someone to care for you at home afterwards. It is recommended that you do not drive, drink alcohol, or make any major legal decisions for at least 24 hours after your procedure is complete.  Chlorhexadine sponge along with instruction/information sheet given to patient. Readybath wipes given in lieu of CHG if patient has CHG allergy. Instructed patient to date, time, and initial the verification sheet once skin prep has been completed, and to return to [facility] the day of the procedure.  ERAS instructions given to patient unless otherwise instructed per surgeon's orders.     Introduction to anesthesia video watched by patient during appointment and anesthesia FAQ tip sheet given to  patient.  Instructions and information given to patient about parking, hospital entrance, and registration location.   Implemented All Fall Risk Interventions:  [place] to call system. Call bell, personal items and telephone within reach. Instruct patient to call for assistance. Room bathroom lighting operational. Non-slip footwear when patient is off stretcher. Physically safe environment: no spills, clutter or unnecessary equipment. Stretcher in lowest position, wheels locked, appropriate side rails in place. Provide visual cue, wrist band, yellow gown, etc. Monitor gait and stability. Monitor for mental status changes and reorient to person, place, and time. Review medications for side effects contributing to fall risk. Reinforce activity limits and safety measures with patient and family. Opt out

## 2025-01-09 ENCOUNTER — APPOINTMENT (OUTPATIENT)
Dept: DERMATOLOGY | Facility: HOSPITAL | Age: 40
End: 2025-01-09

## 2025-02-19 ENCOUNTER — APPOINTMENT (OUTPATIENT)
Dept: ULTRASOUND IMAGING | Facility: CLINIC | Age: 40
End: 2025-02-19

## 2025-03-14 ENCOUNTER — APPOINTMENT (OUTPATIENT)
Dept: ULTRASOUND IMAGING | Facility: CLINIC | Age: 40
End: 2025-03-14
Payer: COMMERCIAL

## 2025-03-14 ENCOUNTER — OUTPATIENT (OUTPATIENT)
Dept: OUTPATIENT SERVICES | Facility: HOSPITAL | Age: 40
LOS: 1 days | End: 2025-03-14
Payer: COMMERCIAL

## 2025-03-14 DIAGNOSIS — Z00.8 ENCOUNTER FOR OTHER GENERAL EXAMINATION: ICD-10-CM

## 2025-03-14 PROCEDURE — 76642 ULTRASOUND BREAST LIMITED: CPT

## 2025-03-14 PROCEDURE — 76642 ULTRASOUND BREAST LIMITED: CPT | Mod: 26,RT

## 2025-06-05 NOTE — ED ADULT NURSE NOTE - NS ED NURSE IV DC DT
28-Mar-2024 23:29